# Patient Record
Sex: MALE | Race: WHITE
[De-identification: names, ages, dates, MRNs, and addresses within clinical notes are randomized per-mention and may not be internally consistent; named-entity substitution may affect disease eponyms.]

---

## 2017-12-02 ENCOUNTER — HOSPITAL ENCOUNTER (EMERGENCY)
Dept: HOSPITAL 61 - ER | Age: 62
Discharge: HOME | End: 2017-12-02
Payer: COMMERCIAL

## 2017-12-02 VITALS — DIASTOLIC BLOOD PRESSURE: 86 MMHG | SYSTOLIC BLOOD PRESSURE: 138 MMHG

## 2017-12-02 VITALS — HEIGHT: 68 IN | WEIGHT: 195 LBS | BODY MASS INDEX: 29.55 KG/M2
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
## 2017-12-02 DIAGNOSIS — I10: ICD-10-CM

## 2017-12-02 DIAGNOSIS — E78.00: ICD-10-CM
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
## 2017-12-02 DIAGNOSIS — S91.352A: Primary | ICD-10-CM

## 2017-12-02 DIAGNOSIS — Z95.5: ICD-10-CM
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
## 2017-12-02 DIAGNOSIS — I25.10: ICD-10-CM
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
## 2017-12-02 PROCEDURE — 90471 IMMUNIZATION ADMIN: CPT

## 2017-12-02 PROCEDURE — 12002 RPR S/N/AX/GEN/TRNK2.6-7.5CM: CPT

## 2017-12-02 PROCEDURE — 90715 TDAP VACCINE 7 YRS/> IM: CPT

## 2017-12-02 NOTE — PHYS DOC
Past Medical History


Past Medical History:  CAD, High Cholesterol, Hypertension


Past Surgical History:  Angioplasty, Other


Additional Past Surgical Histo:  cardiac cath x 5 with 9 stents placed


Alcohol Use:  None


Drug Use:  None





Adult General


Chief Complaint


Chief Complaint:  ANIMAL BITE





HPI


HPI





Patient is a 62  year old L presents to the emergency department with 

complaints of a dog bite to the left foot. Patient states that he was putting a 

shock color on his dog when she nipped at his hand and then began to bite his 

foot. Dog shots are up-to-date. He has had the dog since it was a puppy. The 

dog is 6 years old and patient reports that on to be otherwise healthy. Patient 

complains of a dog bite to the left foot.





Review of Systems


Review of Systems





Constitutional: Denies fever or chills []


Musculoskeletal: Denies back pain or joint pain []


Integument: Dog  Bite, laceration


Neurologic: Denies headache, focal weakness or sensory changes []


Endocrine: Denies polyuria or polydipsia []





All other systems were reviewed and found to be within normal limits, except as 

documented in this note.





Current Medications


Current Medications





Current Medications








 Medications


  (Trade)  Dose


 Ordered  Sig/Bear  Start Time


 Stop Time Status Last Admin


Dose Admin


 


 Diphtheria/


 Tetanus/Acell


 Pertussis


  (Boostrix)  0.5 ml  ONCE ONCE  12/2/17 16:45


 12/2/17 16:46 DC 12/2/17 17:41


0.5 ML


 


 Lidocaine HCl  20 ml  1X  ONCE  12/2/17 16:30


 12/2/17 16:31 DC 12/2/17 17:42


20 ML











Allergies


Allergies





Allergies








Coded Allergies Type Severity Reaction Last Updated Verified


 


  No Known Drug Allergies    7/30/15 No











Physical Exam


Physical Exam





Constitutional: Well developed, well nourished, no acute distress, non-toxic 

appearance. []


Skin: Warm, dry, left foot, dorsal aspect, proximal to the great and second toe 

there is a late laceration with total length of 5 cm. fourth toe does have a 1 

cm superficial laceration to the lateral aspect. Toes neurovascular intact 

distal. Full range of motion of all toes without difficulty.





Current Patient Data


Vital Signs





 Vital Signs








  Date Time  Temp Pulse Resp B/P (MAP) Pulse Ox O2 Delivery O2 Flow Rate FiO2


 


12/2/17 16:15 98.0 77 16  97 Room Air  





 98.0       











EKG


EKG


[]





Radiology/Procedures


Radiology/Procedures


[]





Course & Med Decision Making


Course & Med Decision Making


Pertinent Labs and Imaging studies reviewed. (See chart for details)


Procedure note:  Wound anesthetized with 1% lidocaine, total of 8 mL. The 

wounds were cleansed with Betadine and copiously irrigated, explored for 

foreign body noted which are noted. Wound edges were approximated with a total 

of 11 simple interrupted sutures, 3-0 Ethilon. Patient tolerated procedure 

well. Wound was dressed with bulky bandage.





Dragon Disclaimer


Dragon Disclaimer


This electronic medical record was generated, in whole or in part, using a 

voice recognition dictation system.





Departure


Departure


Impression:  


 Primary Impression:  


 Dog bite


Disposition:  01 HOME, SELF-CARE


Condition:  STABLE


Referrals:  


[facility] (PCP)


Patient Instructions:  Animal Bite, Wound Care, Easy-to-Read


Scripts


Acetaminophen With Codeine (TYLENOL WITH CODEINE #3 TABLET) 1 Each Tablet


1 TAB PO PRN Q6HRS Y for PAIN, #20 TAB


   Prov: KENNETH NORTON         12/2/17 


Amoxicillin/Potassium Clav (AMOX TR-K -125 MG TAB) 1 Each Tablet


1 TAB PO BID, #20 TAB


   Prov: KENNETH NORTON         12/2/17





Problem Qualifiers








 Primary Impression:  


 Dog bite


 Encounter type:  initial encounter  Qualified Codes:  W54.0XXA - Bitten by dog

, initial encounter








KENNETH NORTON Dec 2, 2017 16:33

## 2017-12-13 ENCOUNTER — HOSPITAL ENCOUNTER (EMERGENCY)
Dept: HOSPITAL 61 - ER | Age: 62
LOS: 1 days | Discharge: HOME | End: 2017-12-14
Payer: COMMERCIAL

## 2017-12-13 VITALS
SYSTOLIC BLOOD PRESSURE: 138 MMHG | DIASTOLIC BLOOD PRESSURE: 85 MMHG | DIASTOLIC BLOOD PRESSURE: 85 MMHG | SYSTOLIC BLOOD PRESSURE: 138 MMHG | SYSTOLIC BLOOD PRESSURE: 138 MMHG | DIASTOLIC BLOOD PRESSURE: 85 MMHG

## 2017-12-13 DIAGNOSIS — E78.00: ICD-10-CM
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
## 2017-12-13 DIAGNOSIS — I25.10: ICD-10-CM

## 2017-12-13 DIAGNOSIS — I10: ICD-10-CM
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
## 2017-12-13 DIAGNOSIS — S91.352D: Primary | ICD-10-CM

## 2017-12-13 PROCEDURE — 99282 EMERGENCY DEPT VISIT SF MDM: CPT

## 2017-12-13 NOTE — PHYS DOC
Past Medical History


Past Medical History:  CAD, High Cholesterol, Hypertension


Past Surgical History:  Angioplasty, Other


Additional Past Surgical Histo:  cardiac cath x 5 with 9 stents placed


Alcohol Use:  None


Drug Use:  None





Adult General


Chief Complaint


Chief Complaint:  SUTURE/STAPLE REMOVAL





[facility]


HPI





Patient is a 62  year old male presents to the emergency department with 

request for suture removal from his left foot. Patient was evaluated in the 

emergency department on December 2 after he was bitten on the foot by his dog. 

The wounds were repaired, 11 simple erupted sutures total. Patient's no 

complaints





Review of Systems


Review of Systems





Constitutional: Denies fever or chills []


Eyes: Denies change in visual acuity, redness, or eye pain []


HENT: Denies nasal congestion or sore throat []


Respiratory: Denies cough or shortness of breath []


Cardiovascular: No additional information not addressed in HPI []


GI: Denies abdominal pain, nausea, vomiting, bloody stools or diarrhea []


: Denies dysuria or hematuria []


Musculoskeletal: Denies back pain or joint pain []


Integument: sutures left foot


Neurologic: Denies headache, focal weakness or sensory changes []


Endocrine: Denies polyuria or polydipsia []





All other systems were reviewed and found to be within normal limits, except as 

documented in this note.





Allergies


Allergies





Allergies








Coded Allergies Type Severity Reaction Last Updated Verified


 


  No Known Drug Allergies    7/30/15 No











Physical Exam


Physical Exam





Constitutional: Well developed, well nourished, no acute distress, non-toxic 

appearance. []


Skin: Warm, dry, suture lines well approximated,  mild erythema, nontender, no 

warmth.  Full range of motion all digits, NVI





Current Patient Data


Vital Signs





 Vital Signs








  Date Time  Temp Pulse Resp B/P (MAP) Pulse Ox O2 Delivery O2 Flow Rate FiO2


 


12/13/17 23:50  91 20  99 Room Air  











EKG


EKG


[]





Radiology/Procedures


Radiology/Procedures


[]





Course & Med Decision Making


Course & Med Decision Making


Pertinent Labs and Imaging studies reviewed. (See chart for details)


Suture removal. Area cleansed with NS.   Total 11 sutures removed.  Wound 

dressed with neosporin and band aid.   Pt tolerated well.


[]





Dragon Disclaimer


Dragon Disclaimer


This electronic medical record was generated, in whole or in part, using a 

voice recognition dictation system.





Departure


Departure


Impression:  


 Primary Impression:  


 Visit for suture removal


Disposition:  01 HOME, SELF-CARE


Condition:  STABLE


Referrals:  


[facility] (PCP)


Patient Instructions:  Suture Removal











KENNETH NORTON Dec 13, 2017 23:36

## 2020-07-06 ENCOUNTER — HOSPITAL ENCOUNTER (OUTPATIENT)
Dept: HOSPITAL 61 - NM | Age: 65
Discharge: HOME | End: 2020-07-06
Attending: INTERNAL MEDICINE
Payer: COMMERCIAL

## 2020-07-06 DIAGNOSIS — I48.91: ICD-10-CM

## 2020-07-06 DIAGNOSIS — I25.10: Primary | ICD-10-CM

## 2020-07-06 DIAGNOSIS — I10: ICD-10-CM

## 2020-07-06 PROCEDURE — A9500 TC99M SESTAMIBI: HCPCS

## 2020-07-06 PROCEDURE — 78452 HT MUSCLE IMAGE SPECT MULT: CPT

## 2020-07-06 PROCEDURE — 93017 CV STRESS TEST TRACING ONLY: CPT

## 2020-07-06 NOTE — RAD
MR#: [id number]

Account#: [id number]

Accession#: [id number]

Date of Study: 07/06/2020

Ordering Physician: JOSELUIS KAUFMAN, 

Referring Physician: ROXY SUMNER Tech: RT Jena (R) (N)





--------------- APPROVED REPORT --------------





Test Type:          Pharmacological

Stress Nurse/Tech: Blossom Menjivar R.N.

Test Indications: CAD, SOA

Cardiac History: CAD, 9 stents, COPD, smoker,obese

Medications:     See Electronic Medical Record

Medical History: See Electronic Medical Record

Resting ECG:     SR w/ occ. pvc

Resting Heart Rate: 75 bpm

Resting Blood Pressure: 97/48mmHg

Pretest Chest Pain: No chest pain



Nurse/Tech Notes

S1S2, lungs CTA

Consent: The procedure was explained to the patient in lay terms. Informed consent was witnessed. Joshua
eout was entered into [employer]. History and Stress Test performed by TIM Bailon



Pharm. Details

Pharmacologic stress testing was performed using 0.4mg per 5ml of regadenoson given intravenously ove
r 7-10 seconds.



Stress Symptoms

unable to complete treadmill portion as pt was too SOA- had slight c/p scale 1.5/10 after pt sat down
 post treadmill attempt. pain resolved within 3 minutes-prior to start of lexiscan dose,  post lexisc
an pt had SOA, HA



POST EXERCISE

Reason for Termination: Infusion complete

Max HR: 95 bpm

Max Blood Pressure: 118/66mmHg

Blood Pressure response to exercise: Normal blood pressure response during stress.

Heart Rate response to exercise: wnl

Chest Pain: Yes. see above note

Arrhythmia: No. PVC's at baseline

ST Change: No. 



INTERPRETATION

Stress EKG Conclusion: No evidence of stress induced EKG changes. 



Imaging Protocol

IMAGE PROTOCOL: Rest Tc-99m/stress Tc-99m 1 day



Rest:            Stress:         Viability:   

Radiopharm.Tc99m RntdwwnmnWz12s Sestamibi

Gkrq53jAo            33mCi            

Duration    15min.           10min.           

Img Date  07/06/2020 07/06/2020      

Inj-Img Aroh91qom.           60min.           



Rest Admin Site:IV - Left AntecubitalAdministrator:TIM Bailon

Stress Admin Site: IV - Left AntecubitalAdministrator: TIM Bailon



STRESS DATA

End Diast. Vol.102.0mlLVEDV index BSA48.0ml

End Syst. Vol.39.0mlLVESV index BSA18.0ml

Myocardial Ysrn555.0gEject. Gfglfcad68.0%



Stress Scores

Regional WT0.00Summed WT8.00

Regional WM0.00Summed WM3.00



LV Perfusion

There is a small fixed distal anteroseptal defect suggestive of small prior infarct without active is
chemia. There is also a moderate sized basal inferolateral defect suggestive of prior infarct without
 ischemia. 



Wall Motion

Normal wall motion. EF 65%



LV Perf. Quant

17 Seg. SSS6.00

17 Seg. SRS2.00

17 Seg. SDS4.00

Stress Defect Extent (% LAD)13.80Rest Defect Extent (% LAD)8.80Rev. Defect Extent (% LAD)5.60

Stress Defect Extent (% LCX) 30.00Rest Defect Extent (% LCX)7.50Rev. Defect Extent (% LCX)26.30

Stress Defect Extent (% RCA)0.00Rest Defect Extent (% RCA)6.70Rev. Defect Extent (% RCA)0.00

Stress Defect Extent (% SALMA)10.70Rest Defect Extent (% SALMA)5.70Rev. Defect Extent (% SALMA)7.00



Other Information

Quality:Average

Risk Assessment: Moderate Risk



Conclusion

1. No evidence of stress induced EKG changes.

2. Poor exercise tolerance as patient unable to complete treadmill, had to be converted to chemical s
tress.

3. Fixed distal anterior and basal inferior defects.

4. Normal EF at 65%

5. Moderate risk study



Signed by : Adriel Holt, 

Electronically Approved : 07/06/2020 14:31:43

## 2020-09-08 ENCOUNTER — HOSPITAL ENCOUNTER (OUTPATIENT)
Dept: HOSPITAL 61 - ECHO | Age: 65
Discharge: HOME | End: 2020-09-08
Attending: INTERNAL MEDICINE
Payer: COMMERCIAL

## 2020-09-08 DIAGNOSIS — R06.02: ICD-10-CM

## 2020-09-08 DIAGNOSIS — I35.1: Primary | ICD-10-CM

## 2020-09-08 PROCEDURE — 93306 TTE W/DOPPLER COMPLETE: CPT

## 2020-09-08 NOTE — CARD
MR#: [id number]

Account#: [id number]

Accession#: [id number]

Date of Study: 09/08/2020

Ordering Physician: JOSELUIS KAUFMAN, 

Referring Physician: JOSELUIS KAUFMAN, 

Tech: Leydi Ramsey [facility]





--------------- APPROVED REPORT --------------





EXAM: Two-dimensional and M-mode echocardiogram with Doppler and color Doppler.



Other Information 

Quality : Fair



INDICATION

Dyspnea 



2D DIMENSIONS 

Left Atrium(2D)3.7 (1.6-4.0cm)IVSd0.8 (0.7-1.1cm)

Aortic Root(2D)3.0 (2.0-3.7cm)LVDd4.0 (3.9-5.9cm)

LVOT Diameter2.3 (1.8-2.4cm)PWd0.8 (0.7-1.1cm)

LVDs2.9 (2.5-4.0cm)FS (%) 29.0 %

SV39.8 mlLVEF(%)56.3 (>50%)



Aortic Valve

AoV Peak Callum.138.4cm/sAoV VTI23.8cm

AO Peak GR.7.7mmHgLVOT Peak Callum.123.7cm/s

AO Mean GR.4mmHgAVA (VMAX)3.58cm2

LISSETT   (VTI)3.04dn4MK P 1/2 Nwuz777dn



Mitral Valve

MV E Scrrpdtu24.2cm/sMV DECEL AEFJ650ie

MV A Ivdjnevx732.7cm/sE/A  Ratio0.5



Pulmonary Vein

S1 Fbwhnjps77.3cm/sD2 Kjetvmkr64.6cm/s



 LEFT VENTRICLE 

The left ventricle is normal size. There is normal left ventricular wall thickness. The left ventricu
lar systolic function is normal and the ejection fraction is within normal range. The Ejection Fracti
on is 55-60%. There is normal LV segmental wall motion. Transmitral Doppler flow pattern is Grade I-a
bnormal relaxation pattern.



 RIGHT VENTRICLE 

The right ventricle is normal size. The right ventricular systolic function is normal.



 ATRIA 

The left atrium size is normal. The right atrium size is normal. The interatrial septum is intact wit
h no evidence for an atrial septal defect or patent foramen ovale as noted on 2-D or Doppler imaging.




 AORTIC VALVE 

The aortic valve is not well visualized but grossly appears to be trileaflet. Doppler and Color Flow 
revealed mild aortic regurgitation. There is no significant aortic valvular stenosis.



 MITRAL VALVE 

The mitral valve is normal in structure and function. There is no evidence of mitral valve prolapse. 
There is no mitral valve stenosis. Doppler and Color Flow revealed no mitral valve regurgitation note
d.



 TRICUSPID VALVE 

The tricuspid valve is normal in structure and function. Doppler and Color Flow revealed no tricuspid
 valve regurgitation noted. There is no tricuspid valve stenosis.



 PULMONIC VALVE 

The pulmonic valve is not well visualized. Doppler and Color Flow revealed no pulmonic valvular regur
gitation. There is no pulmonic valvular stenosis.



 GREAT VESSELS 

The aortic root is normal in size. The ascending aorta is not well seen. The IVC was not visualized.



 PERICARDIAL EFFUSION 

There is no evidence of significant pericardial effusion.



Critical Notification

Critical Value: No



<Conclusion>

The left ventricular systolic function is normal and the ejection fraction is within normal range. Th
e Ejection Fraction is 55-60%.

There is normal LV segmental wall motion.



Signed by : Adriel Holt, 

Electronically Approved : 09/08/2020 14:11:17

## 2021-05-24 ENCOUNTER — HOSPITAL ENCOUNTER (EMERGENCY)
Dept: HOSPITAL 61 - ER | Age: 66
Discharge: HOME | End: 2021-05-24
Payer: MEDICARE

## 2021-05-24 VITALS — HEIGHT: 68 IN | BODY MASS INDEX: 33.41 KG/M2 | WEIGHT: 220.46 LBS

## 2021-05-24 VITALS — DIASTOLIC BLOOD PRESSURE: 80 MMHG | SYSTOLIC BLOOD PRESSURE: 128 MMHG

## 2021-05-24 DIAGNOSIS — I25.10: ICD-10-CM

## 2021-05-24 DIAGNOSIS — I25.2: ICD-10-CM

## 2021-05-24 DIAGNOSIS — I10: ICD-10-CM

## 2021-05-24 DIAGNOSIS — I70.202: Primary | ICD-10-CM

## 2021-05-24 DIAGNOSIS — E78.00: ICD-10-CM

## 2021-05-24 DIAGNOSIS — Z95.5: ICD-10-CM

## 2021-05-24 DIAGNOSIS — F17.200: ICD-10-CM

## 2021-05-24 PROCEDURE — 93970 EXTREMITY STUDY: CPT

## 2021-05-24 PROCEDURE — 93923 UPR/LXTR ART STDY 3+ LVLS: CPT

## 2021-05-24 NOTE — RAD
US DPLX ARTR EXTREM LOWER BILAT



Indication: Reason: BLE redness swelling / Spl. Instructions:  / History:  



Comparison: None.



Procedure: Real-time grayscale, color flow Doppler, and Doppler spectral waveform analysis of the art
erial system of the lower extremity is performed.



Findings: 

Right lower extremity: Triphasic waveforms throughout the right lower extremity. Mildly elevated velo
city within the right common femoral artery measures 174 cm/s. Mild atheromatous plaque.



Left lower extremity: Triphasic waveform throughout the left lower extremity. No significant velocity
 elevation. Mild atheromatous plaque.



IMPRESSION:

1.  No arterial occlusion.

2.  Mildly elevated velocity within the right common femoral artery, may indicate 30-49 percent steno
sis.

3.  Mild atheromatous plaque.



Electronically signed by: Mirza San DO (5/24/2021 9:52 PM) [facility]

## 2021-05-24 NOTE — RAD
US BILATERAL LOWEREXTREMITY VENOUS DOPPLER



History: Reason: BLE redness swelling / Spl. Instructions:  / History: 



Comparison:  None.



Discussion: 



Multiple longitudinal and transverse high resolution real-time images of the venous system of [facility] lower extremity were obtained with color and Doppler sampling. The common femoral, superficial fem
oral, popliteal and proximal calf veins are all patent and demonstrate normal flow and compressibilit
y. Normal respiratory phasicity and augmentation is present.  



Impression: 

1.  No evidence of deep vein thrombosis.



Electronically signed by: Mirza San DO (5/24/2021 9:52 PM) [facility]

## 2021-05-24 NOTE — PHYS DOC
Past Medical History


Past Medical History:  CAD, High Cholesterol, Hypertension, MI


Past Surgical History:  Angioplasty, Other


Additional Past Surgical Histo:  cardiac cath x 5 with 9 stents placed


Smoking Status:  Current Every Day Smoker


Alcohol Use:  None


Drug Use:  None





General Adult


EDM:


Chief Complaint:  LOWER EXTREMITY SWELLING





HPI:


HPI:





Patient is a 65  year old male with history of MI, hypertension, CAD, high c

holesterol, who presents to the ED today complaining of bilateral lower 

extremity swelling and redness, symptoms have been going on for 2 days.  Patient

states he has been painting his house and has been on his feet for more than 

normal.  He is worried he is having no circulation to bilateral feet.  He is 

currently on blood thinners





Review of Systems:


Review of Systems:


Constitutional:   Denies fever or chills. []


Eyes:   Denies change in visual acuity. []


HENT:   Denies nasal congestion or sore throat. [] 


Respiratory:   Denies cough or shortness of breath. [] 


Cardiovascular:   Denies chest pain or edema. [] 


GI:   Denies abdominal pain, nausea, vomiting, bloody stools or diarrhea. [] 


:  Denies dysuria. [] 


Musculoskeletal:   Reports bilateral lower extremity swelling, redness, denies 

any back pain


Integument:   Denies rash. [] 


Neurologic:   Denies headache, focal weakness or sensory changes. [] 


Psychiatric:  Denies depression or anxiety. []





Heart Score:


C/O Chest Pain:  N/A


Risk Factors:


Risk Factors:  DM, Current or recent (<one month) smoker, HTN, HLP, family 

history of CAD, obesity.


Risk Scores:


Score 0 - 3:  2.5% MACE over next 6 weeks - Discharge Home


Score 4 - 6:  20.3% MACE over next 6 weeks - Admit for Clinical Observation


Score 7 - 10:  72.7% MACE over next 6 weeks - Early Invasive Strategies





Allergies:


Allergies:





Allergies








Coded Allergies Type Severity Reaction Last Updated Verified


 


  No Known Drug Allergies    7/30/15 No











Physical Exam:


PE:





Constitutional: Well developed, well nourished, no acute distress, non-toxic 

appearance. []


HENT: Normocephalic, atraumatic, bilateral external ears normal, oropharynx 

moist, no oral exudates, nose normal. []


Eyes: PERRLA, EOMI, conjunctiva normal, no discharge. [] 


Neck: Normal range of motion, no tenderness, supple, no stridor. [] 


Cardiovascular:Heart rate regular rhythm, no murmur []


Lungs & Thorax:  Bilateral breath sounds clear to auscultation []


Abdomen: Bowel sounds normal, soft, no tenderness, no masses, no pulsatile 

masses. [] 


Skin: Warm, dry, no erythema, no rash. [] 


Back: No tenderness, no CVA tenderness. [] 


Extremities: No tenderness, no cyanosis, no clubbing, ROM intact, +1 edema to 

bilateral lower extremities, trace redness noted to bilateral toes.  +2 

bilateral pedal pulses, cap refill slightly delayed on the right toes but normal

 on the right toes


Neurologic: Alert and oriented X 3, normal motor function, normal sensory 

function, no focal deficits noted. []


Psychologic: Affect normal, judgement normal, mood normal. []





Current Patient Data:


Vital Signs:





                                   Vital Signs








  Date Time  Temp Pulse Resp B/P (MAP) Pulse Ox O2 Delivery O2 Flow Rate FiO2


 


5/24/21 21:14  72 28 130/82 (98) 99 Nasal Cannula 2.0 


 


5/24/21 18:15 97.9       





 97.9       











EKG:


EKG:


[]





Radiology/Procedures:


Radiology/Procedures:


[]PROCEDURE: VENOUS LOWER EXT BILATERAL





US BILATERAL LOWEREXTREMITY VENOUS DOPPLER





History: Reason: BLE redness swelling / Spl. Instructions:  / History: 





Comparison:  None.





Discussion: 





Multiple longitudinal and transverse high resolution real-time images of the 

venous system of bilateral lower extremity were obtained with color and Doppler 

sampling. The common femoral, superficial femoral, popliteal and proximal calf 

veins are all patent and demonstrate normal flow and compressibility. Normal 

respiratory phasicity and augmentation is present.  





Impression: 


1.  No evidence of deep vein thrombosis.





Electronically signed by: Ronald Barnhart DO (5/24/2021 9:52 PM) [facility]














DICTATED and SIGNED BY:     RONALD BARNHART DO


DATE:     05/24/21 [id number]


PROCEDURE: ARTERIAL STUDY LOWER EXT BI





US DPLX ARTR EXTREM LOWER BILAT





Indication: Reason: BLE redness swelling / Spl. Instructions:  / History:  





Comparison: None.





Procedure: Real-time grayscale, color flow Doppler, and Doppler spectral 

waveform analysis of the arterial system of the lower extremity is performed.





Findings: 


Right lower extremity: Triphasic waveforms throughout the right lower extremity.

 Mildly elevated velocity within the right common femoral artery measures 174 

cm/s. Mild atheromatous plaque.





Left lower extremity: Triphasic waveform throughout the left lower extremity. No

 significant velocity elevation. Mild atheromatous plaque.





IMPRESSION:


1.  No arterial occlusion.


2.  Mildly elevated velocity within the right common femoral artery, may 

indicate 30-49 percent stenosis.


3.  Mild atheromatous plaque.





Electronically signed by: Ronald Barnhart DO (5/24/2021 9:52 PM) [facility]














DICTATED and SIGNED BY:     RONALD BARNHART DO


DATE:     05/24/21 [id number] 0





Course & Med Decision Making:


Course & Med Decision Making


Pertinent Labs and Imaging studies reviewed. (See chart for details)





This is a 65-year-old male patient presenting to the ED today complaining of 

bilateral lower extremity swelling, redness, symptoms since yesterday after 

spending longer.  Of time painting his house.  Patient is worried he has no 

circulation in his lower extremities.  He has +2 pedal pulses bilaterally.





Venous Doppler of bilateral lower extremities are negative.  Arterial Dopplers 

of bilateral lower extremities were noted for stenosis of 30 to 49% on the right

 f common femoral artery.  Mild atheromatous plaques 





Patient was discharged to home, follow-up with vascular.  Compression stockings 

recommended.  He is currently on a blood thinner.  Encouraged to stay off his 

feet





Dragon Disclaimer:


Dragon Disclaimer:


This electronic medical record was generated, in whole or in part, using a voice

 recognition dictation system.





Departure


Departure


Impression:  


   Primary Impression:  


   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


   Additional Impressions:  


   Edema


   Qualified Codes:  R60.9 - Edema, unspecified


   PVD (peripheral vascular disease)


Disposition:  01 HOME / SELF CARE / HOMELESS


Condition:  STABLE


Referrals:  


NO PCP (PCP)








NASRIN GRANT II, MD


follow up in one week


Patient Instructions:  Edema, Easy-to-Read, Peripheral Vascular Disease, 

Easy-to-Read





Additional Instructions:  


You were evaluated in the emergency room and noted to have stenosis on your 

right lower extremity and plaques.  Please follow-up with your vascular surgeon 

as well your primary care doctor and cardiology











ROHINI REYNOLDS            May 24, 2021 22:42

## 2022-01-03 ENCOUNTER — HOSPITAL ENCOUNTER (INPATIENT)
Dept: HOSPITAL 61 - ER | Age: 67
LOS: 2 days | Discharge: ANOTHER HOSPITAL | DRG: 281 | End: 2022-01-05
Attending: INTERNAL MEDICINE | Admitting: INTERNAL MEDICINE
Payer: MEDICARE

## 2022-01-03 VITALS — HEIGHT: 68 IN | BODY MASS INDEX: 37.12 KG/M2 | WEIGHT: 244.93 LBS

## 2022-01-03 VITALS — DIASTOLIC BLOOD PRESSURE: 78 MMHG | SYSTOLIC BLOOD PRESSURE: 121 MMHG

## 2022-01-03 DIAGNOSIS — Z95.5: ICD-10-CM

## 2022-01-03 DIAGNOSIS — I50.9: ICD-10-CM

## 2022-01-03 DIAGNOSIS — T82.855A: ICD-10-CM

## 2022-01-03 DIAGNOSIS — K21.9: ICD-10-CM

## 2022-01-03 DIAGNOSIS — Z82.49: ICD-10-CM

## 2022-01-03 DIAGNOSIS — F17.210: ICD-10-CM

## 2022-01-03 DIAGNOSIS — I25.110: ICD-10-CM

## 2022-01-03 DIAGNOSIS — I73.9: ICD-10-CM

## 2022-01-03 DIAGNOSIS — E78.5: ICD-10-CM

## 2022-01-03 DIAGNOSIS — I25.2: ICD-10-CM

## 2022-01-03 DIAGNOSIS — Z71.6: ICD-10-CM

## 2022-01-03 DIAGNOSIS — E78.00: ICD-10-CM

## 2022-01-03 DIAGNOSIS — E66.9: ICD-10-CM

## 2022-01-03 DIAGNOSIS — I11.0: ICD-10-CM

## 2022-01-03 DIAGNOSIS — Z20.822: ICD-10-CM

## 2022-01-03 DIAGNOSIS — I21.4: Primary | ICD-10-CM

## 2022-01-03 DIAGNOSIS — M19.90: ICD-10-CM

## 2022-01-03 LAB
ALBUMIN SERPL-MCNC: 3.5 G/DL (ref 3.4–5)
ALBUMIN/GLOB SERPL: 0.8 {RATIO} (ref 1–1.7)
ALP SERPL-CCNC: 155 U/L (ref 46–116)
ALT SERPL-CCNC: 56 U/L (ref 16–63)
ANION GAP SERPL CALC-SCNC: 7 MMOL/L (ref 6–14)
APTT BLD: 32 SEC (ref 24–38)
AST SERPL-CCNC: 33 U/L (ref 15–37)
BASOPHILS # BLD AUTO: 0.1 X10^3/UL (ref 0–0.2)
BASOPHILS NFR BLD: 1 % (ref 0–3)
BILIRUB SERPL-MCNC: 0.2 MG/DL (ref 0.2–1)
BUN SERPL-MCNC: 13 MG/DL (ref 8–26)
BUN/CREAT SERPL: 16 (ref 6–20)
CALCIUM SERPL-MCNC: 8.8 MG/DL (ref 8.5–10.1)
CHLORIDE SERPL-SCNC: 103 MMOL/L (ref 98–107)
CO2 SERPL-SCNC: 29 MMOL/L (ref 21–32)
CREAT SERPL-MCNC: 0.8 MG/DL (ref 0.7–1.3)
EOSINOPHIL NFR BLD: 0.2 X10^3/UL (ref 0–0.7)
EOSINOPHIL NFR BLD: 4 % (ref 0–3)
ERYTHROCYTE [DISTWIDTH] IN BLOOD BY AUTOMATED COUNT: 13.9 % (ref 11.5–14.5)
GFR SERPLBLD BASED ON 1.73 SQ M-ARVRAT: 96.7 ML/MIN
GLUCOSE SERPL-MCNC: 160 MG/DL (ref 70–99)
HCT VFR BLD CALC: 41.5 % (ref 39–53)
HGB BLD-MCNC: 13.9 G/DL (ref 13–17.5)
LIPASE: 66 U/L (ref 73–393)
LYMPHOCYTES # BLD: 1.6 X10^3/UL (ref 1–4.8)
LYMPHOCYTES NFR BLD AUTO: 31 % (ref 24–48)
MAGNESIUM SERPL-MCNC: 2 MG/DL (ref 1.8–2.4)
MCH RBC QN AUTO: 29 PG (ref 25–35)
MCHC RBC AUTO-ENTMCNC: 34 G/DL (ref 31–37)
MCV RBC AUTO: 86 FL (ref 79–100)
MONO #: 0.6 X10^3/UL (ref 0–1.1)
MONOCYTES NFR BLD: 11 % (ref 0–9)
NEUT #: 2.8 X10^3/UL (ref 1.8–7.7)
NEUTROPHILS NFR BLD AUTO: 53 % (ref 31–73)
PLATELET # BLD AUTO: 256 X10^3/UL (ref 140–400)
POTASSIUM SERPL-SCNC: 4 MMOL/L (ref 3.5–5.1)
PROT SERPL-MCNC: 7.7 G/DL (ref 6.4–8.2)
PROTHROMBIN TIME: 11.8 SEC (ref 11.7–14)
RBC # BLD AUTO: 4.83 X10^6/UL (ref 4.3–5.7)
SODIUM SERPL-SCNC: 139 MMOL/L (ref 136–145)
WBC # BLD AUTO: 5.3 X10^3/UL (ref 4–11)

## 2022-01-03 PROCEDURE — 80048 BASIC METABOLIC PNL TOTAL CA: CPT

## 2022-01-03 PROCEDURE — 93458 L HRT ARTERY/VENTRICLE ANGIO: CPT

## 2022-01-03 PROCEDURE — 87426 SARSCOV CORONAVIRUS AG IA: CPT

## 2022-01-03 PROCEDURE — 85520 HEPARIN ASSAY: CPT

## 2022-01-03 PROCEDURE — 85027 COMPLETE CBC AUTOMATED: CPT

## 2022-01-03 PROCEDURE — 83690 ASSAY OF LIPASE: CPT

## 2022-01-03 PROCEDURE — 99285 EMERGENCY DEPT VISIT HI MDM: CPT

## 2022-01-03 PROCEDURE — 85610 PROTHROMBIN TIME: CPT

## 2022-01-03 PROCEDURE — 36415 COLL VENOUS BLD VENIPUNCTURE: CPT

## 2022-01-03 PROCEDURE — 83735 ASSAY OF MAGNESIUM: CPT

## 2022-01-03 PROCEDURE — 83880 ASSAY OF NATRIURETIC PEPTIDE: CPT

## 2022-01-03 PROCEDURE — 99153 MOD SED SAME PHYS/QHP EA: CPT

## 2022-01-03 PROCEDURE — 84484 ASSAY OF TROPONIN QUANT: CPT

## 2022-01-03 PROCEDURE — 85730 THROMBOPLASTIN TIME PARTIAL: CPT

## 2022-01-03 PROCEDURE — 93970 EXTREMITY STUDY: CPT

## 2022-01-03 PROCEDURE — 93880 EXTRACRANIAL BILAT STUDY: CPT

## 2022-01-03 PROCEDURE — C8929 TTE W OR WO FOL WCON,DOPPLER: HCPCS

## 2022-01-03 PROCEDURE — 71045 X-RAY EXAM CHEST 1 VIEW: CPT

## 2022-01-03 PROCEDURE — 80061 LIPID PANEL: CPT

## 2022-01-03 PROCEDURE — G0378 HOSPITAL OBSERVATION PER HR: HCPCS

## 2022-01-03 PROCEDURE — 99152 MOD SED SAME PHYS/QHP 5/>YRS: CPT

## 2022-01-03 PROCEDURE — 96374 THER/PROPH/DIAG INJ IV PUSH: CPT

## 2022-01-03 PROCEDURE — 93005 ELECTROCARDIOGRAM TRACING: CPT

## 2022-01-03 PROCEDURE — 80053 COMPREHEN METABOLIC PANEL: CPT

## 2022-01-03 PROCEDURE — 85025 COMPLETE CBC W/AUTO DIFF WBC: CPT

## 2022-01-03 PROCEDURE — 84443 ASSAY THYROID STIM HORMONE: CPT

## 2022-01-03 PROCEDURE — 83036 HEMOGLOBIN GLYCOSYLATED A1C: CPT

## 2022-01-03 RX ADMIN — ATORVASTATIN CALCIUM SCH MG: 40 TABLET, FILM COATED ORAL at 22:00

## 2022-01-03 RX ADMIN — METOPROLOL TARTRATE SCH MG: 25 TABLET ORAL at 22:00

## 2022-01-03 RX ADMIN — HEPARIN SODIUM PRN MLS/HR: 10000 INJECTION, SOLUTION INTRAVENOUS at 18:51

## 2022-01-03 RX ADMIN — PANTOPRAZOLE SODIUM SCH MG: 40 TABLET, DELAYED RELEASE ORAL at 20:35

## 2022-01-03 NOTE — PDOC1
History and Physical


Date of Admission


Date of Admission


DATE: 1/3/22 


TIME: 18:15





Identification/Chief Complaint


Chief Complaint


Chest pain





Source


Source:  Patient





History of Present Illness


History of Present Illness


Mr Elizalde is a 66  year old male with PMHx CAD (s/p cath x5 with 9 stents), 

HLD, HTN, PVD, smoker who presents to ED c/o sub-sternal chest pain that 

radiates down both of his arms that began at 1645 while at rest and was 

continuous, He notes this was similar to prior MI. He experienced some 

discomfort at 1000 this morning but it resolved on its own. He does 

intermittently experience anginal pain but it is generally remitting without 

treatment 1-3 times per month.


Patient denies any cough or fever, no trouble breathing. 


Patient is vaccinated for COVID-19. He does continue to smoke.


Chest radiograph with cardiomegaly, otherwise no acute abnormalities.





EKG heart rate of 95 bpm, sinus rhythm, no ST segment elevation.


Second EKG was done at 1744, heart rate of 91 bpm, sinus rhythm, there is some 

ST segment elevation in lead II and  I.


Labs with WBC 5.3, Hb 13.9, platelets 256, INR 0.9, PTT 32, , K4, BUN 13, 

CR 0.8, glucose 160, calcium 8.8, Magnesium 2, alkaline phosphatase 135 lipase 

66 otherwise LFTs within normal laboratory limits, NT proBNP is 335, high-

sensitivity troponin was 36.


Given NTG x3 with improvement in pain from 8/10 to 2/10 and started on heparin 

GTT and admitted for further care.





Past Medical History


Cardiovascular:  CAD, CHF, HTN, Hyperlipidemia





Past Surgical History


Past Surgical History:  Other





Family History


Family History:  Coronary Artery Disease, High Cholestrol, Hypertension





Social History


Smoke:  <1 pack per day


ALCOHOL:  none


Drugs:  None





Current Problem List


Problem List


Problems


Medical Problems:


(1) Acute coronary syndrome


Status: Acute  





(2) Chest pain


Status: Acute  











Current Medications


Current Medications





Current Medications


Nitroglycerin (Nitrostat) 0.4 mg PRN Q5MIN  PRN SL CHEST PAIN Last administered 

on 1/3/22at 17:30;  Start 1/3/22 at 17:45


Nitroglycerin (Nitrostat) 0.4 mg STK-MED ONCE SL ;  Start 1/3/22 at 17:32;  Stop

1/3/22 at 17:32;  Status DC


Heparin Sodium (Porcine) (Heparin Sodium) 4,000 unit 1X  ONCE IV ;  Start 1/3/22

at 18:15;  Stop 1/3/22 at 18:16


Heparin Sodium/ Dextrose 250 ml @  12.24 mls/ hr CONT  PRN IV PER PROTOCOL;  

Start 1/3/22 at 18:15


Heparin Sodium (Porcine) (Heparin Sodium) 2,550 unit PRN Q6HRS  PRN IV FOR UFH 

LEVEL LESS THAN 0.2;  Start 1/3/22 at 18:15





Active Scripts


Active


Clopidogrel (Clopidogrel Bisulfate) 75 Mg Tablet 75 Mg PO DAILYWBKFT


Aspirin Ec (Aspirin) 81 Mg Tablet.dr 81 Mg PO DAILYWBKFT 90 Days


Reported


Famotidine 40 Mg Tablet 40 Mg PO PRN DAILY PRN


Tums (Calcium Carbonate) 200 Mg Tab.chew 200 Mg PO PRN 1-2XD PRN


Atorvastatin Calcium 40 Mg Tablet 1 Tab PO DAILY


Metoprolol Tartrate 25 Mg Tablet 1 Tab PO BID





Allergies


Allergies:  


Coded Allergies:  


     No Known Drug Allergies (Unverified , 7/30/15)





ROS


General:  YES: Fatigue, Malaise; 


   No: Chills, Night Sweats, Appetite, Other


PSYCHOLOGICAL ROS:  YES: Anxiety; 


   No: Behavioral Disorder, Concentration difficultie, Decreased libido, 

Depression, Disorientation, Hallucinations, Hostility, Irritablity, Memory 

difficulties, Mood Swings, Obsessive thoughts, Physical abuse, Sexual abuse, 

Sleep disturbances, Suicidal ideation, Other


Eyes:  No Blurry vision, No Decreased vision, No Double vision, No Dry eyes, No 

Excessive tearing, No Eye Pain, No Itchy Eyes, No Loss of vision, No Phot

ophobia, No Scotomata, No Uses contacts, No Uses glasses, No Other


HEENT:  No: Heacaches, Visual Changes, Hearing change, Nasal congestion, Nasal 

discharge, Oral lesions, Sinus pain, Sore Throat, Epistaxis, Sneezing, Snoring, 

Tinnitus, Vertigo, Vocal changes, Other


ALLERGY AND IMMUNOLOGY:  No: Hives, Insect Bite Sensitivity, Itchy/Watery Eyes, 

Nasal Congestion, Post Nasal Drip, Seasonal Allergies, Other


Hematological and Lymphatic:  No: Bleeding Problems, Blood Clots, Blood 

Transfusions, Brusing, Night Sweats, Pallor, Swollen Lymph Nodes, Other


ENDOCRINE:  No: Breast Changes, Galactorrhea, Hair Pattern Changes, Hot Flashes,

Malaise/lethargy, Mood Swings, Palpitations, Polydipsia/polyuria, Skin Changes, 

Temperature Intolerance, Unexpected Weight Changes, Other


Breast:  No New/Changing Breast Lumps, No Nipple changes, No Nipple discharge, 

No Other


Respiratory:  YES: Shortness of breath; 


   No: Cough, Hemoptysis, Orthopnea, Pleuritic Pain, SOB with excertion, Sputum 

Changes, Stridor, Tachypnea, Wheezing, Other


Cardiovascular:  yes Chest Pain; 


   No Palpitations, No Orthopnea, No Paroxysmal Noc. Dyspnea, No Edema, No Lt 

Headedness, No Other


Gastrointestinal:  Yes Nausea; 


   No Vomiting, No Abdominal Pain, No Diarrhea, No Constipation, No Melena, No 

Hematochezia, No Other


Genitourinary:  No Dysuria, No Frequency, No Incontinence, No Hematuria, No 

Retention, No Discharge, No Urgency, No Pain, No Flank Pain, No Other, No , No ,

No , No , No , No , No 


Musculoskeletal:  No Gait Disturbance, No Joint Pain, No Joint Stiffness, No 

Joint Swelling, No Muscle Pain, No Muscular Weakness, No Pain In:, No Swelling 

In:, No Other


Neurological:  No Behavorial Changes, No Bowel/Bladder ControlChng, No 

Confusion, No Dizziness, No Gait Disturbance, No Headaches, No Impaired 

Coord/balance, No Memory Loss, No Numbness/Tingling, No Seizures, No Speech 

Problems, No Tremors, No Visual Changes, No Weakness, No Other


Skin:  No Dry Skin, No Eczema, No Hair Changes, No Lumps, No Mole Changes, No 

Mottling, No Nail Changes, No Pruritus, No Rash, No Skin Lesion Changes, No 

Other, No Acne





Physical Exam


General:  Alert, Oriented X3, Cooperative, moderate distress


HEENT:  Atraumatic, PERRLA, EOMI, Mucous membr. moist/pink


Lungs:  Clear to auscultation, Normal air movement


Heart:  S1S2, RRR, no thrills, no rubs, no gallops, no murmurs


Abdomen:  Normal bowel sounds, Soft, No tenderness, No hepatosplenomegaly, No 

masses


Rectal Exam:  not examined


Extremities:  No clubbing, No cyanosis, No edema, Normal pulses, No 

tenderness/swelling


Skin:  No rashes, No breakdown, No significant lesion


Neuro:  Normal gait, Normal speech, Strength at 5/5 X4 ext, Normal tone, 

Sensation intact, Cranial nerves 3-12 NL, Reflexes 2+


Psych/Mental Status:  Mental status NL, Mood NL





Vitals


Vitals





Vital Signs








  Date Time  Temp Pulse Resp B/P (MAP) Pulse Ox O2 Delivery O2 Flow Rate FiO2


 


1/3/22 17:30  92  192/108    


 


1/3/22 17:19 97.7  18  100 Room Air  





 97.7       











Labs


Labs





Laboratory Tests








Test


 1/3/22


17:25


 


White Blood Count


 5.3 x10^3/uL


(4.0-11.0)


 


Red Blood Count


 4.83 x10^6/uL


(4.30-5.70)


 


Hemoglobin


 13.9 g/dL


(13.0-17.5)


 


Hematocrit


 41.5 %


(39.0-53.0)


 


Mean Corpuscular Volume 86 fL () 


 


Mean Corpuscular Hemoglobin 29 pg (25-35) 


 


Mean Corpuscular Hemoglobin


Concent 34 g/dL


(31-37)


 


Red Cell Distribution Width


 13.9 %


(11.5-14.5)


 


Platelet Count


 256 x10^3/uL


(140-400)


 


Neutrophils (%) (Auto) 53 % (31-73) 


 


Lymphocytes (%) (Auto) 31 % (24-48) 


 


Monocytes (%) (Auto) 11 % (0-9) 


 


Eosinophils (%) (Auto) 4 % (0-3) 


 


Basophils (%) (Auto) 1 % (0-3) 


 


Neutrophils # (Auto)


 2.8 x10^3/uL


(1.8-7.7)


 


Lymphocytes # (Auto)


 1.6 x10^3/uL


(1.0-4.8)


 


Monocytes # (Auto)


 0.6 x10^3/uL


(0.0-1.1)


 


Eosinophils # (Auto)


 0.2 x10^3/uL


(0.0-0.7)


 


Basophils # (Auto)


 0.1 x10^3/uL


(0.0-0.2)


 


Sodium Level


 139 mmol/L


(136-145)


 


Potassium Level


 4.0 mmol/L


(3.5-5.1)


 


Chloride Level


 103 mmol/L


()


 


Carbon Dioxide Level


 29 mmol/L


(21-32)


 


Anion Gap 7 (6-14) 


 


Blood Urea Nitrogen


 13 mg/dL


(8-26)


 


Creatinine


 0.8 mg/dL


(0.7-1.3)


 


Estimated GFR


(Cockcroft-Gault) 96.7 





 


BUN/Creatinine Ratio 16 (6-20) 


 


Glucose Level


 160 mg/dL


(70-99)


 


Calcium Level


 8.8 mg/dL


(8.5-10.1)


 


Magnesium Level


 2.0 mg/dL


(1.8-2.4)


 


Total Bilirubin


 0.2 mg/dL


(0.2-1.0)


 


Aspartate Amino Transf


(AST/SGOT) 33 U/L (15-37) 





 


Alanine Aminotransferase


(ALT/SGPT) 56 U/L (16-63) 





 


Alkaline Phosphatase


 155 U/L


()


 


Troponin I High Sensitivity 36 ng/L (4-75) 


 


NT-Pro-B-Type Natriuretic


Peptide 355 pg/mL


(0-124)


 


Total Protein


 7.7 g/dL


(6.4-8.2)


 


Albumin


 3.5 g/dL


(3.4-5.0)


 


Albumin/Globulin Ratio 0.8 (1.0-1.7) 


 


Lipase


 66 U/L


()








Laboratory Tests








Test


 1/3/22


17:25


 


White Blood Count


 5.3 x10^3/uL


(4.0-11.0)


 


Red Blood Count


 4.83 x10^6/uL


(4.30-5.70)


 


Hemoglobin


 13.9 g/dL


(13.0-17.5)


 


Hematocrit


 41.5 %


(39.0-53.0)


 


Mean Corpuscular Volume 86 fL () 


 


Mean Corpuscular Hemoglobin 29 pg (25-35) 


 


Mean Corpuscular Hemoglobin


Concent 34 g/dL


(31-37)


 


Red Cell Distribution Width


 13.9 %


(11.5-14.5)


 


Platelet Count


 256 x10^3/uL


(140-400)


 


Neutrophils (%) (Auto) 53 % (31-73) 


 


Lymphocytes (%) (Auto) 31 % (24-48) 


 


Monocytes (%) (Auto) 11 % (0-9) 


 


Eosinophils (%) (Auto) 4 % (0-3) 


 


Basophils (%) (Auto) 1 % (0-3) 


 


Neutrophils # (Auto)


 2.8 x10^3/uL


(1.8-7.7)


 


Lymphocytes # (Auto)


 1.6 x10^3/uL


(1.0-4.8)


 


Monocytes # (Auto)


 0.6 x10^3/uL


(0.0-1.1)


 


Eosinophils # (Auto)


 0.2 x10^3/uL


(0.0-0.7)


 


Basophils # (Auto)


 0.1 x10^3/uL


(0.0-0.2)


 


Sodium Level


 139 mmol/L


(136-145)


 


Potassium Level


 4.0 mmol/L


(3.5-5.1)


 


Chloride Level


 103 mmol/L


()


 


Carbon Dioxide Level


 29 mmol/L


(21-32)


 


Anion Gap 7 (6-14) 


 


Blood Urea Nitrogen


 13 mg/dL


(8-26)


 


Creatinine


 0.8 mg/dL


(0.7-1.3)


 


Estimated GFR


(Cockcroft-Gault) 96.7 





 


BUN/Creatinine Ratio 16 (6-20) 


 


Glucose Level


 160 mg/dL


(70-99)


 


Calcium Level


 8.8 mg/dL


(8.5-10.1)


 


Magnesium Level


 2.0 mg/dL


(1.8-2.4)


 


Total Bilirubin


 0.2 mg/dL


(0.2-1.0)


 


Aspartate Amino Transf


(AST/SGOT) 33 U/L (15-37) 





 


Alanine Aminotransferase


(ALT/SGPT) 56 U/L (16-63) 





 


Alkaline Phosphatase


 155 U/L


()


 


Troponin I High Sensitivity 36 ng/L (4-75) 


 


NT-Pro-B-Type Natriuretic


Peptide 355 pg/mL


(0-124)


 


Total Protein


 7.7 g/dL


(6.4-8.2)


 


Albumin


 3.5 g/dL


(3.4-5.0)


 


Albumin/Globulin Ratio 0.8 (1.0-1.7) 


 


Lipase


 66 U/L


()











Images


Images


Chest radiograph:


The heart is enlarged but stable.  There is no pneumothorax or effusion. No air 

space or interstitial disease.





Impression: 


1. No acute cardiopulmonary process.





VTE Prophylaxis Ordered


VTE Prophylaxis Devices:  Yes


VTE Pharmacological Prophylaxi:  Yes





Assessment/Plan


Assessment/Plan


A/P:


Chest pain - with repeat EKG showing changes, will cont heparin GTT, trend 

troponins. consult cardiology. Smoking cessation emphasized. Will give 

nitropaste q6hrs for unstable angina. Protonix for GI PPX


CAD - s/p cath x5 with 9 stents - high risk repeat MI


HLD - statin


HTN - metoprolol, nitropaste


PVD - on ASA, plavix


Smoker - counseled on cessation





FEN - NPO


PPX - heparin


FULL CODE


Dispo - inpatient [facility] for unstable angina





Justifications for Admission


Other Justification














TREVON DA SILVA MD         Nathanael 3, 2022 18:16

## 2022-01-03 NOTE — PHYS DOC
Past Medical History


Past Medical History:  CAD, High Cholesterol, Hypertension, MI


Additional Past Medical Histor:  NSTEMI,PVD


Past Surgical History:  Angioplasty, Other


Additional Past Surgical Histo:  cardiac cath x 5 with 9 stents placed


Smoking Status:  Current Every Day Smoker


Alcohol Use:  None


Drug Use:  None





General Adult


EDM:


Chief Complaint:  CHEST PAIN





HPI:


HPI:





Patient is a 66  year old male who present to ER for evaluation of substernal 

chest pain that radiated to his arm.  Symptoms started at 10 AM this morning.  

Patient denies any cough or fever, no trouble breathing.  Patient denies history

of coronary artery disease in the past, had multiple stents in the past.  

Patient is on blood thinner, plavix.  Patient denies any recent travel 

operation.  Patient is vaccinated for COVID-19.  Patient denies any exposure to 

anybody with COVID-19 infection





Review of Systems:


Review of Systems:


Constitutional:   Denies fever or chills. []


Eyes:   Denies change in visual acuity. []


HENT:   Denies nasal congestion or sore throat. [] 


Respiratory:   Denies cough or shortness of breath. [] 


Cardiovascular:   Positive for chest pain, no edema


GI:   Denies abdominal pain, nausea, vomiting, bloody stools or diarrhea. [] 


:  Denies dysuria. [] 


Musculoskeletal:   Denies back pain or joint pain. [] 


Integument:   Denies rash. [] 


Neurologic:   Denies headache, focal weakness or sensory changes. [] 


Endocrine:   Denies polyuria or polydipsia. [] 


Lymphatic:  Denies swollen glands. [] 


Psychiatric:  Denies depression or anxiety. []





Heart Score:


C/O Chest Pain:  Yes


HEART Score for Chest Pain:  








HEART Score for Chest Pain Response (Comments) Value


 


History Highly Suspicious 2


 


ECG Nonspecific Repolarizatio 1


 


Age > 65 2


 


Risk Factors >3 Risk Factors or Hx CAD 2


 


Troponin < Normal Limit 0


 


Total  7








Risk Factors:


Risk Factors:  DM, Current or recent (<one month) smoker, HTN, HLP, family 

history of CAD, obesity.


Risk Scores:


Score 0 - 3:  2.5% MACE over next 6 weeks - Discharge Home


Score 4 - 6:  20.3% MACE over next 6 weeks - Admit for Clinical Observation


Score 7 - 10:  72.7% MACE over next 6 weeks - Early Invasive Strategies





Current Medications:





Current Medications








 Medications


  (Trade)  Dose


 Ordered  Sig/Bear  Start Time


 Stop Time Status Last Admin


Dose Admin


 


 Nitroglycerin


  (Nitrostat)  0.4 mg  STK-MED ONCE  1/3/22 17:32


 1/3/22 17:32 DC  














Allergies:


Allergies:





Allergies








Coded Allergies Type Severity Reaction Last Updated Verified


 


  No Known Drug Allergies    7/30/15 No











Physical Exam:


PE:





Constitutional: Well developed, well nourished, no acute distress, non-toxic 

appearance. []


HENT: Normocephalic, atraumatic, bilateral external ears normal, oropharynx 

moist, no oral exudates, nose normal. []


Eyes: PERRLA, EOMI, conjunctiva normal, no discharge. [] 


Neck: Normal range of motion, no tenderness, supple, no stridor. [] 


Cardiovascular:Heart rate regular rhythm, no murmur []


Lungs & Thorax:  Bilateral breath sounds clear to auscultation []


Abdomen: Bowel sounds normal, soft, no tenderness, no masses, no pulsatile 

masses. [] 


Skin: Warm, dry, no erythema, no rash. [] 


Back: No tenderness, no CVA tenderness. [] 


Extremities: No tenderness, no cyanosis, no clubbing, ROM intact, no edema. [] 


Neurologic: Alert and oriented X 3, normal motor function, normal sensory 

function, no focal deficits noted. []


Psychologic: Affect normal, judgement normal, mood normal. []





Current Patient Data:


Labs:





Laboratory Tests








Test


 1/3/22


17:25


 


White Blood Count 5.3 x10^3/uL 


 


Red Blood Count 4.83 x10^6/uL 


 


Hemoglobin 13.9 g/dL 


 


Hematocrit 41.5 % 


 


Mean Corpuscular Volume 86 fL 


 


Mean Corpuscular Hemoglobin 29 pg 


 


Mean Corpuscular Hemoglobin


Concent 34 g/dL 





 


Red Cell Distribution Width 13.9 % 


 


Platelet Count 256 x10^3/uL 


 


Neutrophils (%) (Auto) 53 % 


 


Lymphocytes (%) (Auto) 31 % 


 


Monocytes (%) (Auto) 11 % 


 


Eosinophils (%) (Auto) 4 % 


 


Basophils (%) (Auto) 1 % 


 


Neutrophils # (Auto) 2.8 x10^3/uL 


 


Lymphocytes # (Auto) 1.6 x10^3/uL 


 


Monocytes # (Auto) 0.6 x10^3/uL 


 


Eosinophils # (Auto) 0.2 x10^3/uL 


 


Basophils # (Auto) 0.1 x10^3/uL 


 


Sodium Level 139 mmol/L 


 


Potassium Level 4.0 mmol/L 


 


Chloride Level 103 mmol/L 


 


Carbon Dioxide Level 29 mmol/L 


 


Anion Gap 7 


 


Blood Urea Nitrogen 13 mg/dL 


 


Creatinine 0.8 mg/dL 


 


Estimated GFR


(Cockcroft-Gault) 96.7 





 


BUN/Creatinine Ratio 16 


 


Glucose Level 160 mg/dL 


 


Calcium Level 8.8 mg/dL 


 


Magnesium Level 2.0 mg/dL 


 


Total Bilirubin 0.2 mg/dL 


 


Aspartate Amino Transf


(AST/SGOT) 33 U/L 





 


Alanine Aminotransferase


(ALT/SGPT) 56 U/L 





 


Alkaline Phosphatase 155 U/L 


 


Troponin I High Sensitivity 36 ng/L 


 


NT-Pro-B-Type Natriuretic


Peptide 355 pg/mL 





 


Total Protein 7.7 g/dL 


 


Albumin 3.5 g/dL 


 


Albumin/Globulin Ratio 0.8 


 


Lipase 66 U/L 








Current Medications








 Medications


  (Trade)  Dose


 Ordered  Sig/Bear


 Route


 PRN Reason  Start Time


 Stop Time Status Last Admin


Dose Admin


 


 Nitroglycerin


  (Nitrostat)  0.4 mg  PRN Q5MIN  PRN


 SL


 CHEST PAIN  1/3/22 17:45


    1/3/22 17:30





 


 Nitroglycerin


  (Nitrostat)  0.4 mg  STK-MED ONCE


 SL


   1/3/22 17:32


 1/3/22 17:32 DC  











Laboratory Tests








Test


 1/3/22


17:25


 


White Blood Count 5.3 x10^3/uL 


 


Red Blood Count 4.83 x10^6/uL 


 


Hemoglobin 13.9 g/dL 


 


Hematocrit 41.5 % 


 


Mean Corpuscular Volume 86 fL 


 


Mean Corpuscular Hemoglobin 29 pg 


 


Mean Corpuscular Hemoglobin


Concent 34 g/dL 





 


Red Cell Distribution Width 13.9 % 


 


Platelet Count 256 x10^3/uL 


 


Neutrophils (%) (Auto) 53 % 


 


Lymphocytes (%) (Auto) 31 % 


 


Monocytes (%) (Auto) 11 % 


 


Eosinophils (%) (Auto) 4 % 


 


Basophils (%) (Auto) 1 % 


 


Neutrophils # (Auto) 2.8 x10^3/uL 


 


Lymphocytes # (Auto) 1.6 x10^3/uL 


 


Monocytes # (Auto) 0.6 x10^3/uL 


 


Eosinophils # (Auto) 0.2 x10^3/uL 


 


Basophils # (Auto) 0.1 x10^3/uL 


 


Sodium Level 139 mmol/L 


 


Potassium Level 4.0 mmol/L 


 


Chloride Level 103 mmol/L 


 


Carbon Dioxide Level 29 mmol/L 


 


Anion Gap 7 


 


Blood Urea Nitrogen 13 mg/dL 


 


Creatinine 0.8 mg/dL 


 


Estimated GFR


(Cockcroft-Gault) 96.7 





 


BUN/Creatinine Ratio 16 


 


Glucose Level 160 mg/dL 


 


Calcium Level 8.8 mg/dL 


 


Magnesium Level 2.0 mg/dL 


 


Total Bilirubin 0.2 mg/dL 


 


Aspartate Amino Transf


(AST/SGOT) 33 U/L 





 


Alanine Aminotransferase


(ALT/SGPT) 56 U/L 





 


Alkaline Phosphatase 155 U/L 


 


Troponin I High Sensitivity 36 ng/L 


 


NT-Pro-B-Type Natriuretic


Peptide 355 pg/mL 





 


Total Protein 7.7 g/dL 


 


Albumin 3.5 g/dL 


 


Albumin/Globulin Ratio 0.8 


 


Lipase 66 U/L 








Current Medications








 Medications


  (Trade)  Dose


 Ordered  Sig/Bear


 Route


 PRN Reason  Start Time


 Stop Time Status Last Admin


Dose Admin


 


 Nitroglycerin


  (Nitrostat)  0.4 mg  PRN Q5MIN  PRN


 SL


 CHEST PAIN  1/3/22 17:45


    1/3/22 17:30





 


 Nitroglycerin


  (Nitrostat)  0.4 mg  STK-MED ONCE


 SL


   1/3/22 17:32


 1/3/22 17:32 DC  











Vital Signs:





                                   Vital Signs








  Date Time  Temp Pulse Resp B/P (MAP) Pulse Ox O2 Delivery O2 Flow Rate FiO2


 


1/3/22 17:30  92  192/108    











EKG:


EKG:


EKG was done at 1724, heart rate of 95 bpm, sinus rhythm, no ST segment 

elevation.





Radiology/Procedures:


Radiology/Procedures:


[]





Course & Med Decision Making:


Course & Med Decision Making


Pertinent Labs and Imaging studies reviewed. (See chart for details)





Patient is a 66-year-old male who present to ER due to chest pain, patient does 

have history of coronary disease, first EKG was done at 1724, heart rate 95 bpm,

 sinus rhythm, no ST segment elevation appreciated.





Second EKG was done at 1744, heart rate of 91 bpm, sinus rhythm, there is some 

ST segment elevation in lead II and  I,  I discussed with the cardiologist on 

call, Dr. GRUBER who did review the EKG and did not think that patient met 

criteria for STEMI.  He recommended admit patient to the hospital, start patient

 on heparin protocol.





Discussed with Dr. Clemons who agreed to admit the patient.





Dragon Disclaimer:


Dragon Disclaimer:


This electronic medical record was generated, in whole or in part, using a voice

 recognition dictation system.





Departure


Departure


Impression:  


   Primary Impression:  


   Chest pain


   Additional Impression:  


   Acute coronary syndrome


Disposition:  09 ADMITTED AS INPATIENT


Admitting Physician:  ZENA (DR. CLEMONS)


Condition:  IMPROVED


Referrals:  


NO PCP (PCP)











DEBBIE MONTEIRO DO                 Nathanael 3, 2022 17:44

## 2022-01-03 NOTE — RAD
Single AP view of the chest.



Comparison:  8/13/2021.



Indication: Chest pain



Findings: 



The heart is enlarged but stable.  There is no pneumothorax or effusion. No air space or interstitial
 disease.



Impression: 



1. No acute cardiopulmonary process.





Electronically signed by: Tavo Harden MD (1/3/2022 6:05 PM) [facility]

## 2022-01-04 VITALS — SYSTOLIC BLOOD PRESSURE: 141 MMHG | DIASTOLIC BLOOD PRESSURE: 82 MMHG

## 2022-01-04 VITALS — DIASTOLIC BLOOD PRESSURE: 76 MMHG | SYSTOLIC BLOOD PRESSURE: 119 MMHG

## 2022-01-04 VITALS — SYSTOLIC BLOOD PRESSURE: 129 MMHG | DIASTOLIC BLOOD PRESSURE: 71 MMHG

## 2022-01-04 VITALS — SYSTOLIC BLOOD PRESSURE: 126 MMHG | DIASTOLIC BLOOD PRESSURE: 70 MMHG

## 2022-01-04 VITALS — SYSTOLIC BLOOD PRESSURE: 99 MMHG | DIASTOLIC BLOOD PRESSURE: 54 MMHG

## 2022-01-04 VITALS — DIASTOLIC BLOOD PRESSURE: 74 MMHG | SYSTOLIC BLOOD PRESSURE: 127 MMHG

## 2022-01-04 VITALS — SYSTOLIC BLOOD PRESSURE: 129 MMHG | DIASTOLIC BLOOD PRESSURE: 76 MMHG

## 2022-01-04 VITALS — DIASTOLIC BLOOD PRESSURE: 68 MMHG | SYSTOLIC BLOOD PRESSURE: 131 MMHG

## 2022-01-04 VITALS — DIASTOLIC BLOOD PRESSURE: 74 MMHG | SYSTOLIC BLOOD PRESSURE: 114 MMHG

## 2022-01-04 VITALS — SYSTOLIC BLOOD PRESSURE: 137 MMHG | DIASTOLIC BLOOD PRESSURE: 78 MMHG

## 2022-01-04 VITALS — SYSTOLIC BLOOD PRESSURE: 126 MMHG | DIASTOLIC BLOOD PRESSURE: 79 MMHG

## 2022-01-04 VITALS — SYSTOLIC BLOOD PRESSURE: 112 MMHG | DIASTOLIC BLOOD PRESSURE: 74 MMHG

## 2022-01-04 VITALS — DIASTOLIC BLOOD PRESSURE: 66 MMHG | SYSTOLIC BLOOD PRESSURE: 113 MMHG

## 2022-01-04 VITALS — SYSTOLIC BLOOD PRESSURE: 123 MMHG | DIASTOLIC BLOOD PRESSURE: 78 MMHG

## 2022-01-04 VITALS — SYSTOLIC BLOOD PRESSURE: 127 MMHG | DIASTOLIC BLOOD PRESSURE: 67 MMHG

## 2022-01-04 VITALS — SYSTOLIC BLOOD PRESSURE: 123 MMHG | DIASTOLIC BLOOD PRESSURE: 72 MMHG

## 2022-01-04 VITALS — SYSTOLIC BLOOD PRESSURE: 107 MMHG | DIASTOLIC BLOOD PRESSURE: 57 MMHG

## 2022-01-04 VITALS — SYSTOLIC BLOOD PRESSURE: 113 MMHG | DIASTOLIC BLOOD PRESSURE: 66 MMHG

## 2022-01-04 LAB
ANION GAP SERPL CALC-SCNC: 9 MMOL/L (ref 6–14)
BUN SERPL-MCNC: 11 MG/DL (ref 8–26)
CALCIUM SERPL-MCNC: 8.5 MG/DL (ref 8.5–10.1)
CHLORIDE SERPL-SCNC: 105 MMOL/L (ref 98–107)
CHOLEST SERPL-MCNC: 204 MG/DL (ref 0–200)
CHOLEST/HDLC SERPL: 4.5 {RATIO}
CO2 SERPL-SCNC: 27 MMOL/L (ref 21–32)
CREAT SERPL-MCNC: 0.8 MG/DL (ref 0.7–1.3)
ERYTHROCYTE [DISTWIDTH] IN BLOOD BY AUTOMATED COUNT: 14.1 % (ref 11.5–14.5)
GFR SERPLBLD BASED ON 1.73 SQ M-ARVRAT: 96.7 ML/MIN
GLUCOSE SERPL-MCNC: 97 MG/DL (ref 70–99)
HCT VFR BLD CALC: 38.4 % (ref 39–53)
HDLC SERPL-MCNC: 45 MG/DL (ref 40–60)
HGB BLD-MCNC: 12.9 G/DL (ref 13–17.5)
LDLC: 127 MG/DL (ref 0–100)
MCH RBC QN AUTO: 29 PG (ref 25–35)
MCHC RBC AUTO-ENTMCNC: 34 G/DL (ref 31–37)
MCV RBC AUTO: 86 FL (ref 79–100)
PLATELET # BLD AUTO: 227 X10^3/UL (ref 140–400)
POTASSIUM SERPL-SCNC: 4.3 MMOL/L (ref 3.5–5.1)
RBC # BLD AUTO: 4.5 X10^6/UL (ref 4.3–5.7)
SODIUM SERPL-SCNC: 141 MMOL/L (ref 136–145)
TRIGL SERPL-MCNC: 161 MG/DL (ref 0–150)
VLDLC: 32 MG/DL (ref 0–40)
WBC # BLD AUTO: 6.9 X10^3/UL (ref 4–11)

## 2022-01-04 PROCEDURE — B2111ZZ FLUOROSCOPY OF MULTIPLE CORONARY ARTERIES USING LOW OSMOLAR CONTRAST: ICD-10-PCS | Performed by: INTERNAL MEDICINE

## 2022-01-04 PROCEDURE — 4A023N7 MEASUREMENT OF CARDIAC SAMPLING AND PRESSURE, LEFT HEART, PERCUTANEOUS APPROACH: ICD-10-PCS | Performed by: INTERNAL MEDICINE

## 2022-01-04 PROCEDURE — B2151ZZ FLUOROSCOPY OF LEFT HEART USING LOW OSMOLAR CONTRAST: ICD-10-PCS | Performed by: INTERNAL MEDICINE

## 2022-01-04 RX ADMIN — ASPIRIN SCH MG: 325 TABLET, DELAYED RELEASE ORAL at 08:36

## 2022-01-04 RX ADMIN — NITROGLYCERIN SCH INCH: 20 OINTMENT TOPICAL at 19:59

## 2022-01-04 RX ADMIN — NITROGLYCERIN SCH INCH: 20 OINTMENT TOPICAL at 14:23

## 2022-01-04 RX ADMIN — NICOTINE SCH PATCH: 21 PATCH, EXTENDED RELEASE TOPICAL at 08:40

## 2022-01-04 RX ADMIN — NITROGLYCERIN SCH INCH: 20 OINTMENT TOPICAL at 02:38

## 2022-01-04 RX ADMIN — HEPARIN SODIUM PRN MLS/HR: 10000 INJECTION, SOLUTION INTRAVENOUS at 15:28

## 2022-01-04 RX ADMIN — FENTANYL CITRATE PRN MCG: 50 INJECTION INTRAMUSCULAR; INTRAVENOUS at 00:10

## 2022-01-04 RX ADMIN — METOPROLOL TARTRATE SCH MG: 25 TABLET ORAL at 14:24

## 2022-01-04 RX ADMIN — METOPROLOL TARTRATE SCH MG: 25 TABLET ORAL at 20:44

## 2022-01-04 RX ADMIN — FENTANYL CITRATE PRN MCG: 50 INJECTION INTRAMUSCULAR; INTRAVENOUS at 14:22

## 2022-01-04 RX ADMIN — ATORVASTATIN CALCIUM SCH MG: 40 TABLET, FILM COATED ORAL at 20:44

## 2022-01-04 RX ADMIN — NITROGLYCERIN SCH INCH: 20 OINTMENT TOPICAL at 08:45

## 2022-01-04 RX ADMIN — PANTOPRAZOLE SODIUM SCH MG: 40 TABLET, DELAYED RELEASE ORAL at 08:36

## 2022-01-04 RX ADMIN — SODIUM CHLORIDE SCH MLS/HR: 450 INJECTION, SOLUTION INTRAVENOUS at 12:00

## 2022-01-04 RX ADMIN — FENTANYL CITRATE PRN MCG: 50 INJECTION INTRAMUSCULAR; INTRAVENOUS at 08:51

## 2022-01-04 RX ADMIN — FENTANYL CITRATE PRN MCG: 50 INJECTION INTRAMUSCULAR; INTRAVENOUS at 19:59

## 2022-01-04 NOTE — NUR
SS following for discharge planning. SS reviewed pt chart and discussed with pt RN. Pt is 
from home with spouse and is currently on room air. COVID19 negative on rapid test. 
Cardiology consulted. Pt had heart cath today. SS will continue to follow for discharge 
planning. 

-------------------------------------------------------------------------------

Addendum: 01/04/22 at 1630 by PARK MICHELLE SS

-------------------------------------------------------------------------------

Per Cardiology, pt needing transfer for heart surgery. Pt accepted at [facility] per [facility] transfer center, [phone]; fax [phone]. Accepting physician Dr. Owens. Currently awaiting bed availability at this time. SS and Cardiology discussed with pt 
and family in room. Pt and family adamantly refusing transfer to [facility]. Pt and family 
requested transfers to , [facility], and [facility]. All hospitals 
contacted and pt denied for transfer. SS and Cardiology revisited with pt and family and pt 
and family continue to refuse transfer to [facility]. Dr. Rascon contacting pt and family to 
discuss. Pt's RN notified.

## 2022-01-04 NOTE — NUR
spoke with Dean and his wife regarding possible to transfer to [facility] for 
poss CABG. At this time; he does not want to transfer. wife supports his decision. He was 
informed that [facility] is only taking time sensitive cases, [facility]'s and [facility] are on diversion and 
not accepting patients. spoke with Geeta in the transfer center for [facility] and if he decides to 
transfer to call [phone]

## 2022-01-04 NOTE — EKG
[facility]

              [address] [place] [address]

Test Date:    2022               Test Time:    17:24:05

Pat Name:     VIOLA HIGGINS           Department:   

Patient ID:   [id number]           Room:         [facility]

Gender:       M                        Technician:   

:          [id number]               Requested By: DEBBIE MONTEIRO

Order Number: [id number]           Reading MD:   David Rascon

                                 Measurements

Intervals                              Axis          

Rate:         95                       P:            90

NY:           152                      QRS:          -38

QRSD:         108                      T:            74

QT:           340                                    

QTc:          430                                    

                           Interpretive Statements

SINUS RHYTHM

ATRIAL PREMATURE COMPLEX(ES)

ABNORMAL LEFT AXIS DEVIATION

LEFT ANTERIOR FASCICULAR BLOCK

LVH WITH REPOLARIZATION ABNORMALITY

Electronically Signed On 2022 15:16:30 CST by David Rascon

## 2022-01-04 NOTE — EKG
[facility]

              [address] [place] [address]

Test Date:    2022               Test Time:    20:16:56

Pat Name:     VIOLA HIGGINS           Department:   

Patient ID:   [id number]           Room:         [facility]

Gender:       M                        Technician:   

:          [id number]               Requested By: DEBBIE MONTEIRO

Order Number: [id number]           Reading MD:   David Rascon

                                 Measurements

Intervals                              Axis          

Rate:         77                       P:            39

TN:           146                      QRS:          -36

QRSD:         108                      T:            50

QT:           386                                    

QTc:          439                                    

                           Interpretive Statements

SINUS RHYTHM

LEFT ATRIAL ABNORMALITY

LEFT ANTERIOR FASCICULAR BLOCK

Electronically Signed On 2022 15:13:30 CST by David Rascon

## 2022-01-04 NOTE — NUR
in bathroom. unable to remove TR band; passed to next shift. Heparin continues as same rate. 
last ufh was 0.35--call for no change in rate. next UHF p.laced for 0500 in am.

## 2022-01-04 NOTE — PDOC2
DUNCAN NEFF APRN 1/4/22 0928:


CARDIAC CONSULT


DATE OF CONSULT


Date of Consult


DATE: 1/4/22 


TIME: 09:22





REASON FOR CONSULT


Reason for Consult:


Chest pain





REFERRING PHYSICIAN


Referring Physician:


[place]





SOURCE


Source:  Chart review, Patient





HISTORY OF PRESENT ILLNESS


HISTORY OF PRESENT ILLNESS


This is a pleasant 65 yo male admitted for complains of chest pain. Reports of 

SIDDIQUI. Has been having sensation of indigestion but no nausea or vomiting. Denies 

any palpitations but has been having stabbing chest pain that radiates to his 

jaw and arms. No recent falls or injury. No flu like symptoms and has been fully

vaccinated for covid-19. Has been ASA but has not been taking plavix and statin 

for over a year and just decided to stop taking it. He follows with Dr. Garcia.

He continues to smoke tobacco as well.





PAST MEDICAL HISTORY


Cardiovascular:  CAD, HTN, Hyperlipidemia


Pulmonary:  No pertinent hx


CENTRAL NERVOUS SYSTEM:  Other (No pertinent history)


GI:  GERD


Heme/Onc:  No pertinent hx


Psych:  No pertinent hx


Musculoskeletal:  Osteoarthritis


Infectious disease:  No pertinent hx


ENT:  No pertinent hx


Renal/:  No pertinent hx





PAST SURGICAL HISTORY


Past Surgical History:  Other (PCI)





FAMILY HISTORY


Family History:  Coronary Artery Disease





SOCIAL HISTORY


Smoke:  <1 pack per day


ALCOHOL:  none


Drugs:  None


Lives:  with Family





CURRENT MEDICATIONS


CURRENT MEDICATIONS





Current Medications








 Medications


  (Trade)  Dose


 Ordered  Sig/Bear


 Route


 PRN Reason  Start Time


 Stop Time Status Last Admin


Dose Admin


 


 Nitroglycerin


  (Nitrostat)  0.4 mg  PRN Q5MIN  PRN


 SL


 CHEST PAIN  1/3/22 17:45


    1/3/22 17:30





 


 Heparin Sodium


  (Porcine)


  (Heparin Sodium)  4,000 unit  1X  ONCE


 IV


   1/3/22 18:15


 1/3/22 18:16 DC 1/3/22 18:52





 


 Heparin Sodium/


 Dextrose  250 ml @ 


 12.24 mls/


 hr  CONT  PRN


 IV


 PER PROTOCOL  1/3/22 18:15


    1/3/22 18:51





 


 Heparin Sodium


  (Porcine)


  (Heparin Sodium)  2,550 unit  PRN Q6HRS  PRN


 IV


 FOR UFH LEVEL LESS THAN 0.2  1/3/22 18:15


    1/4/22 02:15





 


 Fentanyl Citrate


  (Fentanyl 2ml


 Vial)  25 mcg  PRN Q3HRS  PRN


 IVP


 SEVERE PAIN 7-10  1/3/22 19:45


    1/4/22 08:51





 


 Nitroglycerin


  (Nitro-Bid Oint)  1 inch  1X  ONCE


 TP


   1/3/22 19:45


 1/3/22 19:50 DC 1/3/22 20:27





 


 Pantoprazole


 Sodium


  (Protonix)  40 mg  DAILY07


 PO


   1/3/22 20:45


    1/4/22 08:36





 


 Nitroglycerin


  (Nitro-Bid Oint)  1 inch  Q6HRS


 TP


   1/4/22 00:00


    1/4/22 08:45





 


 Aspirin


  (Ecotrin)  325 mg  DAILYWBKFT


 PO


   1/4/22 08:00


    1/4/22 08:36





 


 Nicotine


  (Nicoderm Cq


 21mg)  1 patch  DAILY


 TD


   1/4/22 09:00


    1/4/22 08:40














ALLERGIES


ALLERGIES:  


Coded Allergies:  


     No Known Drug Allergies (Unverified , 7/30/15)





ROS


Review of System


14 point ROS evaluated with pertinent positives noted per HPI





PHYSICAL EXAM


General:  Alert, Oriented X3, Cooperative, No acute distress


HEENT:  Atraumatic, Mucous membr. moist/pink


Lungs:  Clear to auscultation, Normal air movement


Heart:  Regular rate (SR), Normal S1, Normal S2, No murmurs


Abdomen:  Soft, No tenderness


Extremities:  No cyanosis, Other (2+ bilateral LE pitting edema)


Skin:  No breakdown, No significant lesion


Neuro:  Normal speech, Sensation intact


Psych/Mental Status:  Mental status NL, Mood NL


MUSCULOSKELETAL:  Full range of motion without pain





VITALS/I&O


VITALS/I&O:





                                   Vital Signs








  Date Time  Temp Pulse Resp B/P (MAP) Pulse Ox O2 Delivery O2 Flow Rate FiO2


 


1/4/22 08:51   16   Room Air  


 


1/4/22 08:45  77  99/57    


 


1/4/22 07:20 97.7    94   





 97.7       














                                    I & O   


 


 1/3/22 1/3/22 1/4/22





 15:00 23:00 07:00


 


Intake Total   0 ml


 


Balance   0 ml











LABS


Lab:





                                Laboratory Tests








Test


 1/3/22


17:25 1/3/22


19:29 1/4/22


00:50 1/4/22


07:30


 


White Blood Count


 5.3 x10^3/uL


(4.0-11.0) 


 


 





 


Red Blood Count


 4.83 x10^6/uL


(4.30-5.70) 


 


 





 


Hemoglobin


 13.9 g/dL


(13.0-17.5) 


 


 





 


Hematocrit


 41.5 %


(39.0-53.0) 


 


 





 


Mean Corpuscular Volume


 86 fL ()


 


 


 





 


Mean Corpuscular Hemoglobin 29 pg (25-35)     


 


Mean Corpuscular Hemoglobin


Concent 34 g/dL


(31-37) 


 


 





 


Red Cell Distribution Width


 13.9 %


(11.5-14.5) 


 


 





 


Platelet Count


 256 x10^3/uL


(140-400) 


 


 





 


Neutrophils (%) (Auto) 53 % (31-73)     


 


Lymphocytes (%) (Auto) 31 % (24-48)     


 


Monocytes (%) (Auto) 11 % (0-9)  H   


 


Eosinophils (%) (Auto) 4 % (0-3)  H   


 


Basophils (%) (Auto) 1 % (0-3)     


 


Neutrophils # (Auto)


 2.8 x10^3/uL


(1.8-7.7) 


 


 





 


Lymphocytes # (Auto)


 1.6 x10^3/uL


(1.0-4.8) 


 


 





 


Monocytes # (Auto)


 0.6 x10^3/uL


(0.0-1.1) 


 


 





 


Eosinophils # (Auto)


 0.2 x10^3/uL


(0.0-0.7) 


 


 





 


Basophils # (Auto)


 0.1 x10^3/uL


(0.0-0.2) 


 


 





 


Prothrombin Time


 11.8 SEC


(11.7-14.0) 


 


 





 


Prothrombin Time INR 0.9 (0.8-1.1)     


 


Activated Partial


Thromboplast Time 32 SEC (24-38)


 


 


 





 


Sodium Level


 139 mmol/L


(136-145) 


 


 





 


Potassium Level


 4.0 mmol/L


(3.5-5.1) 


 


 





 


Chloride Level


 103 mmol/L


() 


 


 





 


Carbon Dioxide Level


 29 mmol/L


(21-32) 


 


 





 


Anion Gap 7 (6-14)     


 


Blood Urea Nitrogen


 13 mg/dL


(8-26) 


 


 





 


Creatinine


 0.8 mg/dL


(0.7-1.3) 


 


 





 


Estimated GFR


(Cockcroft-Gault) 96.7  


 


 


 





 


BUN/Creatinine Ratio 16 (6-20)     


 


Glucose Level


 160 mg/dL


(70-99)  H 


 


 





 


Calcium Level


 8.8 mg/dL


(8.5-10.1) 


 


 





 


Magnesium Level


 2.0 mg/dL


(1.8-2.4) 


 


 





 


Total Bilirubin


 0.2 mg/dL


(0.2-1.0) 


 


 





 


Aspartate Amino Transferase


(AST) 33 U/L (15-37)


 


 


 





 


Alanine Aminotransferase (ALT)


 56 U/L (16-63)


 


 


 





 


Alkaline Phosphatase


 155 U/L


()  H 


 


 





 


Troponin I High Sensitivity


 36 ng/L (4-75)


 455 ng/L


(4-75)  H 3981 ng/L


(4-75)  H 





 


NT-Pro-B-Type Natriuretic


Peptide 355 pg/mL


(0-124)  H 


 


 





 


Total Protein


 7.7 g/dL


(6.4-8.2) 


 


 





 


Albumin


 3.5 g/dL


(3.4-5.0) 


 


 





 


Albumin/Globulin Ratio


 0.8 (1.0-1.7)


L 


 


 





 


Lipase


 66 U/L


()  L 


 


 





 


Heparin Anti-Xa Act,


Unfractionated 


 


 < 0.10 IU/mL


(0.30-0.70)  L 





 


SARS-CoV-2 Antigen (Rapid)


 


 


 


 Negative


(NEGATIVE)





                                Laboratory Tests


1/3/22 17:25








                                Laboratory Tests


1/3/22 17:25











ECHOCARDIOGRAM


ECHOCARDIOGRAM





<Conclusion>


The left ventricle is normal size.


The left ventricular systolic function is normal and the ejection fraction is 

within normal range. 


LV ejection fraction 50-55%.


There is normal left ventricular wall thickness.


Doppler and Color Flow revealed mild aortic regurgitation.


There is no significant aortic valvular stenosis.


Doppler and Color-flow revealed mild mitral regurgitation.


Doppler and Color Flow revealed no tricuspid valve regurgitation noted.





DATE:     08/16/21 [id number] 0





HEART CATH


HEART CATH


Conclusion


1.  Severe coronary artery disease as described above with patent stents in the 

right coronary artery, 40% in-stent restenosis in the left anterior descending 

artery and 95% thrombotic in-stent stenosis in the diagonal branch.  The left 

circumflex artery showed chronic total occlusion in the midsegment.  A long 

lower division of the diagonal branch showed chronic total occlusion proximally 

with distal reconstitution from left to left collaterals.


2.  Successful PCI/drug-eluting stent placement to the left anterior descending 

artery and successful PTCA to the diagonal branch.





Recommendations


1.  Aspirin 325 mg daily for 1 month followed by 81 mg daily


2.  Plavix 75 mg daily


3.  Cardiovascular risk factor modification and cardiac rehabilitation referral





DATE:     12/07/20 [id number] 0





ASSESSMENT/PLAN


ASSESSMENT/PLAN


1. NSTEMI


2. CAD: past PCI


3. HTN


4. HLP


5. Tobaccoism


6. Obesity





Recommendations


1. LHC today with possible PCI, risks and benefits discussed and agreeable to 

proceed


2. Discussed smoking cessation and treatment compliance as he has not taken 

statin and plavix for over a yr


3. FLP, TSH


4. ASA, heparin drip ongoing,. Secondary prevention measures.





TYRA GRUBER MD 1/5/22 0559:


CARDIAC CONSULT


ASSESSMENT/PLAN


ASSESSMENT/PLAN


Patient seen and examined. Agree with NP's assessment and plan


Patient with history of CAD s/p multiple PCI/stents placement presently admitted

with NSTEMI


Plan for cardiac cath and possible PCI


Continue heparin infusion per protocol


Importance of compliance with meds and smoking cessation reemphasized


Thank you for your consultation











DUNCAN NEFF           Jan 4, 2022 09:28


TYRA GRUBER MD            Jan 5, 2022 05:59

## 2022-01-04 NOTE — RAD
EXAM: Bilateral carotid duplex with waveform analysis.



CLINICAL HISTORY: Reason: pre-op eval / Spl. Instructions:  / History: . 



TECHNIQUE: Longitudinal and transverse sonographic images of the bilateral carotid arteries was perfo
rmed utilizing grayscale, color and spectral Doppler techniques.



COMPARISON: None



FINDINGS:

Right Carotid: Moderate plaque and wall thickening at the carotid bulb with no hemodynamically signif
icant stenosis

Left Carotid: Moderate plaque and wall thickening at the carotid bulb with no hemodynamically signifi
cant stenosis

Vertebrals: Antegrade flow bilaterally.



____________________________________



Right:

PSV CCA (cm/s):   77

PSV ICA (cm/s):    73

EDV ICA (cm/s):    27

PSV ECA (cm/s):    131

ICA/CCA Ratio:    0.75



Left:

PSV CCA (cm/s):   95

PSV ICA (cm/s):    82

EDV ICA (cm/s):    31

PSV ECA (cm/s):    120

ICA/CCA Ratio:    0.78



____________________________________





  

IMPRESSION:    

Moderate smooth plaque at the carotid bulbs with no evidence of hemodynamically significant stenosis 
of bilateral internal carotid arteries







Consensus Panel Gray-scale and Doppler US Criteria for Diagnosis of ICA Stenosis

Degree of Stenosis (%)  ICA PSV

(Cm/sec)  Plaque Estimate (%)*

Normal  <125  None

<50  <125  <50

50-69  125-230  >50

>70 but 

< near occlusion  >230  >50

Near occlusion  High, low, or undetectable  Visible

Total occlusion  Undetectable  Visible, no detectable lumen

*Plaque estimate (diameter reduction) with gray-scale and color Doppler US



Degree of Stenosis (%)  ICA/CCA PSV Ratio     ICA EDV

(cm/sec)

Normal  <2.0  <40

<50  <2.0  <40

50-69  2.0-4.0  

>70 but 

< near occlusion  >4.0  >100

Near occlusion  Variable  Variable

Total occlusion  Not applicable  Not applicable



Electronically signed by: Brijesh Khan DO (1/4/2022 1:58 PM) [id number]

## 2022-01-04 NOTE — PDOC
[facility] PROGRESS NOTE


Date of Service


DOS:


DATE: 1/4/22 


TIME: 12:13





Chief Complaint


Chief Complaint


Assessment/Plan


A/P:


Chest pain - with repeat EKG showing changes, will cont heparin GTT, trend 

troponins. consult cardiology. Smoking cessation emphasized. Will give 

nitropaste q6hrs for unstable angina. Protonix for GI PPX


CAD - s/p cath x5 with 9 stents - high risk repeat MI. Pending cardiac cath 

today.


HLD - statin


HTN - metoprolol, nitropaste


PVD - on ASA, plavix


Smoker - counseled on cessation





FEN - NPO


PPX - heparin


FULL CODE


Dispo - inpatient CVC for unstable angina





History of Present Illness


History of Present Illness


 66  year old male with PMHx CAD (s/p cath x5 with 9 stents), HLD, HTN, PVD, 

smoker who presents to ED c/o sub-sternal chest pain that radiates down both of 

his arms that began at 1645 while at rest and was continuous, He notes this was 

similar to prior MI. He experienced some discomfort at 1000 this morning but it 

resolved on its own. He does intermittently experience anginal pain but it is 

generally remitting without treatment 1-3 times per month.





1/4/2022


No acute events overnight. Patient seen examined bedside. Patient taken to the 

Cath Lab for cardiac cath. Rising troponins. Patient's chart, labs, images were 

reviewed and discussed with RN





Vitals/I&O


Vitals/I&O:





                                   Vital Signs








  Date Time  Temp Pulse Resp B/P (MAP) Pulse Ox O2 Delivery O2 Flow Rate FiO2


 


1/4/22 11:36  86 16  99 Nasal Cannula 2.0 


 


1/4/22 08:45    99/57    


 


1/4/22 07:20 97.7       





 97.7       














                                    I & O   


 


 1/3/22 1/3/22 1/4/22





 15:00 23:00 07:00


 


Intake Total   0 ml


 


Balance   0 ml











Physical Exam


General:  Alert, Oriented X3, Cooperative, No acute distress


Heart:  Regular rate (SR), Normal S1, Normal S2, No murmurs


Lungs:  Clear


Abdomen:  Soft, No tenderness


Extremities:  No cyanosis, Other (2+ bilateral LE pitting edema)


Skin:  No breakdown, No significant lesion





Labs


Labs:





Laboratory Tests








Test


 1/3/22


17:25 1/3/22


19:29 1/4/22


00:50 1/4/22


07:30


 


White Blood Count


 5.3 x10^3/uL


(4.0-11.0) 


 


 





 


Red Blood Count


 4.83 x10^6/uL


(4.30-5.70) 


 


 





 


Hemoglobin


 13.9 g/dL


(13.0-17.5) 


 


 





 


Hematocrit


 41.5 %


(39.0-53.0) 


 


 





 


Mean Corpuscular Volume 86 fL ()    


 


Mean Corpuscular Hemoglobin 29 pg (25-35)    


 


Mean Corpuscular Hemoglobin


Concent 34 g/dL


(31-37) 


 


 





 


Red Cell Distribution Width


 13.9 %


(11.5-14.5) 


 


 





 


Platelet Count


 256 x10^3/uL


(140-400) 


 


 





 


Neutrophils (%) (Auto) 53 % (31-73)    


 


Lymphocytes (%) (Auto) 31 % (24-48)    


 


Monocytes (%) (Auto) 11 % (0-9)    


 


Eosinophils (%) (Auto) 4 % (0-3)    


 


Basophils (%) (Auto) 1 % (0-3)    


 


Neutrophils # (Auto)


 2.8 x10^3/uL


(1.8-7.7) 


 


 





 


Lymphocytes # (Auto)


 1.6 x10^3/uL


(1.0-4.8) 


 


 





 


Monocytes # (Auto)


 0.6 x10^3/uL


(0.0-1.1) 


 


 





 


Eosinophils # (Auto)


 0.2 x10^3/uL


(0.0-0.7) 


 


 





 


Basophils # (Auto)


 0.1 x10^3/uL


(0.0-0.2) 


 


 





 


Prothrombin Time


 11.8 SEC


(11.7-14.0) 


 


 





 


Prothromb Time International


Ratio 0.9 (0.8-1.1) 


 


 


 





 


Activated Partial


Thromboplast Time 32 SEC (24-38) 


 


 


 





 


Sodium Level


 139 mmol/L


(136-145) 


 


 





 


Potassium Level


 4.0 mmol/L


(3.5-5.1) 


 


 





 


Chloride Level


 103 mmol/L


() 


 


 





 


Carbon Dioxide Level


 29 mmol/L


(21-32) 


 


 





 


Anion Gap 7 (6-14)    


 


Blood Urea Nitrogen


 13 mg/dL


(8-26) 


 


 





 


Creatinine


 0.8 mg/dL


(0.7-1.3) 


 


 





 


Estimated GFR


(Cockcroft-Gault) 96.7 


 


 


 





 


BUN/Creatinine Ratio 16 (6-20)    


 


Glucose Level


 160 mg/dL


(70-99) 


 


 





 


Calcium Level


 8.8 mg/dL


(8.5-10.1) 


 


 





 


Magnesium Level


 2.0 mg/dL


(1.8-2.4) 


 


 





 


Total Bilirubin


 0.2 mg/dL


(0.2-1.0) 


 


 





 


Aspartate Amino Transf


(AST/SGOT) 33 U/L (15-37) 


 


 


 





 


Alanine Aminotransferase


(ALT/SGPT) 56 U/L (16-63) 


 


 


 





 


Alkaline Phosphatase


 155 U/L


() 


 


 





 


Troponin I High Sensitivity


 36 ng/L (4-75) 


 455 ng/L


(4-75) 3981 ng/L


(4-75) 





 


NT-Pro-B-Type Natriuretic


Peptide 355 pg/mL


(0-124) 


 


 





 


Total Protein


 7.7 g/dL


(6.4-8.2) 


 


 





 


Albumin


 3.5 g/dL


(3.4-5.0) 


 


 





 


Albumin/Globulin Ratio 0.8 (1.0-1.7)    


 


Lipase


 66 U/L


() 


 


 





 


Heparin Anti-Xa Act,


Unfractionated 


 


 < 0.10 IU/mL


(0.30-0.70) 





 


SARS-CoV-2 Antigen (Rapid)


 


 


 


 Negative


(NEGATIVE)


 


Test


 1/4/22


09:15 


 


 





 


White Blood Count


 6.9 x10^3/uL


(4.0-11.0) 


 


 





 


Red Blood Count


 4.50 x10^6/uL


(4.30-5.70) 


 


 





 


Hemoglobin


 12.9 g/dL


(13.0-17.5) 


 


 





 


Hematocrit


 38.4 %


(39.0-53.0) 


 


 





 


Mean Corpuscular Volume 86 fL ()    


 


Mean Corpuscular Hemoglobin 29 pg (25-35)    


 


Mean Corpuscular Hemoglobin


Concent 34 g/dL


(31-37) 


 


 





 


Red Cell Distribution Width


 14.1 %


(11.5-14.5) 


 


 





 


Platelet Count


 227 x10^3/uL


(140-400) 


 


 





 


Heparin Anti-Xa Act,


Unfractionated 0.32 IU/mL


(0.30-0.70) 


 


 





 


Sodium Level


 141 mmol/L


(136-145) 


 


 





 


Potassium Level


 4.3 mmol/L


(3.5-5.1) 


 


 





 


Chloride Level


 105 mmol/L


() 


 


 





 


Carbon Dioxide Level


 27 mmol/L


(21-32) 


 


 





 


Anion Gap 9 (6-14)    


 


Blood Urea Nitrogen


 11 mg/dL


(8-26) 


 


 





 


Creatinine


 0.8 mg/dL


(0.7-1.3) 


 


 





 


Estimated GFR


(Cockcroft-Gault) 96.7 


 


 


 





 


Glucose Level


 97 mg/dL


(70-99) 


 


 





 


Calcium Level


 8.5 mg/dL


(8.5-10.1) 


 


 





 


Triglycerides Level


 161 mg/dL


(0-150) 


 


 





 


Cholesterol Level


 204 mg/dL


(0-200) 


 


 





 


LDL Cholesterol, Calculated


 127 mg/dL


(0-100) 


 


 





 


VLDL Cholesterol, Calculated


 32 mg/dL


(0-40) 


 


 





 


Non-HDL Cholesterol Calculated


 159 mg/dL


(0-129) 


 


 





 


HDL Cholesterol


 45 mg/dL


(40-60) 


 


 





 


Cholesterol/HDL Ratio 4.5    


 


Thyroid Stimulating Hormone


(TSH) 1.723 uIU/mL


(0.358-3.74) 


 


 














Assessment and Plan


Assessmemt and Plan


Problems


Medical Problems:


(1) Acute coronary syndrome


Status: Acute  





(2) Chest pain


Status: Acute  











Comment


Review of Relevant


I have reviewed the following items yimi (where applicable) has been applied.


Medications:





Current Medications








 Medications


  (Trade)  Dose


 Ordered  Sig/Bear


 Route


 PRN Reason  Start Time


 Stop Time Status Last Admin


Dose Admin


 


 Nitroglycerin


  (Nitrostat)  0.4 mg  PRN Q5MIN  PRN


 SL


 CHEST PAIN  1/3/22 17:45


    1/3/22 17:30





 


 Heparin Sodium


  (Porcine)


  (Heparin Sodium)  4,000 unit  1X  ONCE


 IV


   1/3/22 18:15


 1/3/22 18:16 DC 1/3/22 18:52





 


 Heparin Sodium/


 Dextrose  250 ml @ 


 12.24 mls/


 hr  CONT  PRN


 IV


 PER PROTOCOL  1/3/22 18:15


    1/3/22 18:51





 


 Heparin Sodium


  (Porcine)


  (Heparin Sodium)  2,550 unit  PRN Q6HRS  PRN


 IV


 FOR UFH LEVEL LESS THAN 0.2  1/3/22 18:15


    1/4/22 02:15





 


 Fentanyl Citrate


  (Fentanyl 2ml


 Vial)  25 mcg  PRN Q3HRS  PRN


 IVP


 SEVERE PAIN 7-10  1/3/22 19:45


    1/4/22 08:51





 


 Nitroglycerin


  (Nitro-Bid Oint)  1 inch  1X  ONCE


 TP


   1/3/22 19:45


 1/3/22 19:50 DC 1/3/22 20:27





 


 Pantoprazole


 Sodium


  (Protonix)  40 mg  DAILY07


 PO


   1/3/22 20:45


    1/4/22 08:36





 


 Nitroglycerin


  (Nitro-Bid Oint)  1 inch  Q6HRS


 TP


   1/4/22 00:00


    1/4/22 08:45





 


 Aspirin


  (Ecotrin)  325 mg  DAILYWBKFT


 PO


   1/4/22 08:00


    1/4/22 08:36





 


 Nicotine


  (Nicoderm Cq


 21mg)  1 patch  DAILY


 TD


   1/4/22 09:00


    1/4/22 08:40





 


 Sodium Chloride  1,000 ml @ 


 100 mls/hr  1X  ONCE


 IV


   1/4/22 10:30


 1/4/22 20:29  1/4/22 10:30





 


 Nitroglycerin


  (Nitroglycerin)  200 mcg  1X  ONCE


 IART


   1/4/22 11:15


 1/4/22 11:16 DC 1/4/22 11:15





 


 Verapamil HCl


  (Verapamil)  2.5 mg  1X  ONCE


 IART


   1/4/22 11:15


 1/4/22 11:16 DC 1/4/22 11:15





 


 Heparin Sodium


  (Porcine)


  (Heparin Sodium)  2,500 unit  1X  ONCE


 IART


   1/4/22 11:15


 1/4/22 11:16 DC 1/4/22 11:15





 


 Heparin Sodium/


 Sodium Chloride


  (HEPARIN for


 ARTERIAL LINE


 FLUSH)  1,000 unit  1X  ONCE


 IART


   1/4/22 11:15


 1/4/22 11:16 DC 1/4/22 11:15





 


 Heparin Sodium/


 Sodium Chloride


  (HEPARIN for


 ARTERIAL LINE


 FLUSH)  1,000 unit  1X  ONCE


 IART


   1/4/22 11:15


 1/4/22 11:16 DC 1/4/22 11:15





 


 Midazolam HCl


  (Versed)  2 mg  1X  ONCE


 IV


   1/4/22 11:15


 1/4/22 11:16 DC 1/4/22 11:10





 


 Fentanyl Citrate


  (Fentanyl 2ml


 Vial)  100 mcg  1X  ONCE


 IV


   1/4/22 11:15


 1/4/22 11:16 DC 1/4/22 11:10





 


 Iodixanol


  (Visipaque 320)  100 ml  1X  ONCE


 IART


   1/4/22 11:15


 1/4/22 11:16 DC 1/4/22 11:30





 


 Lidocaine HCl


  (Xylocaine-Mpf


 1% 2ml Vial)  2 ml  1X  ONCE


 INJ


   1/4/22 11:15


 1/4/22 11:16 DC 1/4/22 11:14














Justifications for Admission


Other Justification














BRYAN STAFFORD MD                   Jan 4, 2022 12:16

## 2022-01-04 NOTE — RAD
EXAMINATION: US VENOUS MAPPING BILAT



INDICATION: Reason: pre-op CABG / Spl. Instructions:  / History: 



COMPARISON: None



TECHNIQUE: Grayscale, color and spectral Doppler evaluation of the bilateral lower extremity venous s
ystem(s) was performed for venous mapping.



FINDINGS:

RIGHT:

DEEP VEINS:    

GREAT SAPHENOUS VEIN FLOW: Patent

RIGHT GREAT SAPHENOUS VEIN DIAMETERS: 0.7 cm

PROXIMAL THIGH: 0.6 cm 

MID THIGH: 0.34 cm

DISTAL THIGH: 0.24 cm 

PROXIMAL CALF: 0.3 cm 

MID CALF: 0.24 cm 

DISTAL CALF: 0.28 cm 



LEFT:

DEEP VEINS:    

GREAT SAPHENOUS VEIN FLOW: Patent

LEFT GREAT SAPHENOUS VEIN DIAMETERS: 0.67 cm

PROXIMAL THIGH: 0.6 cm   

MID THIGH:    0.43 cm

DISTAL THIGH:  0.4 cm  

PROXIMAL CALF:  0.32cm  

MID CALF:    0.26cm

DISTAL CALF:    0.33cm



IMPRESSION:

Bilateral greater saphenous and superficial saphenous veins are patent and free of clot. Measurements
 of bilateral lower extremity greater saphenous veins as above.



Electronically signed by: Brijesh Khan DO (1/4/2022 1:50 PM) [id number]

## 2022-01-04 NOTE — NUR
returned from cardiac cath. he answers questions but is drowsy. has o2 on at 2l. IVF 
resumed. TR band to right wrist area. fingers warms to touch. no complaints at this time.

## 2022-01-04 NOTE — CARD
MR#: [id number]

Account#: [id number]

Accession#: [id number]

Date of Study: 01/04/2022

Ordering Physician: TYRA RASCON, 

Referring Physician: TYRA RASCON 

Tech: RT Brynn(R)





--------------- APPROVED REPORT --------------





Technologist: Carolyn Castañeda RT(R)

Nurse: Marietta Johnson RN



Procedure(s) performed: Left heart catheterization, selective coronary angiography and left ventricul
ography via right transradial approach



FLUORO TIME: 2.6 MIN

DOSE: 65 Gycm2

Contrast: 94ml

Mod Sedation: 30 MINS



INDICATION

The indication(s) include : non-STEMI .



[facility] Clinical Frailty Scale

[facility] Clinical Frailty Scale: Moderately Frail



Heart Failure

Heart Failure: No



CASE TECHNIQUE

IV conscious sedation was used throughout procedure with appropriate monitoring and was performed in 
the presence of a registered nurse who was an independent trained observer other than the physician p
erforming the procedure. During this case, Fluoroscopy and low osmolar contrast were used for imaging
. Specimen(s) Removed: No Estimated Blood loss: 15 cc's.



PROCEDURE NARRATIVE

After explaining the risks, benefits and alternative options, informed consent was obtained from cher
ent.  Patient was brought to the cardiac Cath Lab and right wrist was prepped and draped in the usual
 fashion after confirming a positive modified Osorio's test.  Arterial access was obtained in the righ
t radial artery and a 6 Dutch sheath was inserted.  6 Dutch Don catheter was used to perform binh
ective angiography of the left and right coronary arteries.  6 Dutch pigtail catheter was used to pe
rform left ventriculography.  Patient tolerated the procedure well.  Hemostasis was achieved using TR
 band.  There were no immediate complications.  The following findings were noted.



FINDINGS

1.  Hemodynamics: Left ventricular end-diastolic pressure of 17 mmHg.  No pullback gradient across th
e aortic valve.

2.  Left ventriculography: Hypokinesis of the mid anterolateral wall and the diaphragmatic wall with 
ejection fraction estimated at 45%.  No significant mitral regurgitation seen.

3.  Coronary angiography:

a.  The left main coronary artery arose from the left sinus of Valsalva, gave rise to the left anteri
or descending and left circumflex arteries and showed 20% stenosis in the distal segment.

b.  The left anterior descending artery showed 80% in-stent restenosis in the mid segment.  The first
 diagonal branch showed 100% in-stent occlusion in the proximal to mid segment.  A proximal branch of
 this vessel showed 100% chronic total occlusion with very good distal reconstitution from left to le
ft collaterals, described in prior cardiac catheterization.

c.  The left circumflex artery showed 70% stenosis in the proximal segment and 100% chronic total occ
lusion in the midsegment with faint distal reconstitution from right to left collaterals.  This was d
escribed in prior cardiac catheterizations.

d.  The right coronary artery was a large and dominant vessel arising from the right sinus of Valsalv
a that showed widely patent stent in the proximal to mid segment and a 90 to 95% in-stent restenosis 
in the mid to distal segment.  The posterolateral branch showed patent stent in the proximal segment 
but just proximal to the stent, there was a 70% stenosis noted.





Conclusion

1.  Severe three-vessel coronary artery disease including significant in-stent restenosis involving t
he left anterior descending and right coronary arteries

2.  Hypokinesis of the mid anterolateral wall and the diaphragmatic wall with ejection fraction estim
ated at 45%



Recommendations

Cardiothoracic surgery referral for possible coronary artery bypass surgery



Signed by : Tyra Rascon, 

Electronically Approved : 01/04/2022 12:02:57

## 2022-01-04 NOTE — NUR
attempted to remove 2 cc of air from TR band. started oozing from site. He is getting the 
carotid doppler, echo and the 12 lead ekg are completed. again uninstructed to not move his 
right hand. reinserted 2 cc air and and extra 2 ccs as instructed. VSS . given sandwich 
drink  for lunch ; tolerated well. states that his shoulder is uncomfortable andwas given 
fentanyl.

## 2022-01-04 NOTE — EKG
[facility]

              [address] [place] [address]

Test Date:    2022               Test Time:    17:44:41

Pat Name:     VIOLA HIGGINS           Department:   

Patient ID:   [id number]           Room[facility]

Gender:       M                        Technician:   

:          [id number]               Requested By: DEBBIE MONTEIRO

Order Number: [id number]           Reading MD:   David Rascon

                                 Measurements

Intervals                              Axis          

Rate:         91                       P:            139

CA:           154                      QRS:          -145

QRSD:         106                      T:            126

QT:           368                                    

QTc:          454                                    

                           Interpretive Statements

SINUS RHYTHM

ST & T ABNORMALITY, CONSIDER

HIGH LATERAL MYOCARDIAL OR PERICARDIAL DAMAGE

ABNORMAL ECG

Electronically Signed On 2022 15:15:34 CST by David Rascon

## 2022-01-04 NOTE — CARD
MR#: [id number]

Account#: [id number]

Accession#: [id number]

Date of Study: 01/04/2022

Ordering Physician: LICO KASPER, 

Referring Physician: LICO KASPER, 

Tech: Kirsten Hale [facility]





--------------- APPROVED REPORT --------------





EXAM: Two-dimensional and M-mode echocardiogram with Doppler and color Doppler.



Other Information 

Quality : Technically LimitedHR: 79bpm

Rhythm : NSR



INDICATION

Cardiac Disease: CAD 



Echo Enhancing Agent

Indication: Endocardial border delineation

Agent/Amount Used: Optison 2mL



RISK FACTORS

Hypertension 

Obesity   

Hyperlipidemia



2D DIMENSIONS 

RVDd3.5 (2.9-3.5cm)Left Atrium(2D)3.5 (1.6-4.0cm)

IVSd1.0 (0.7-1.1cm)LVDd3.1 (3.9-5.9cm)

PWd1.2 (0.7-1.1cm)LVDs2.0 (2.5-4.0cm)

FS (%) 35.3 %SV25.0 ml

LVEF(%)66.2 (>50%)



Aortic Valve

AoV Peak Callum.155.1cm/sAoV VTI32.4cm

AO Peak GR.9.6mmHgLVOT Peak Callum.124.3cm/s

AO Mean GR.5mmHg



Mitral Valve

MV E Esdsdhip22.5cm/sMV DECEL RYCU071kn

MV A Sxhcaegs332.1cm/sE/A  Ratio0.7



Pulmonary Valve

PV Peak Kyesimkv849.0cm/s



 LEFT VENTRICLE 

The left ventricle is normal size. There is borderline concentric left ventricular hypertrophy. Poste
robasal wall hypokinesis with ejection fraction estimated at 50-55%.



 RIGHT VENTRICLE 

The right ventricle is normal size. There is normal right ventricular wall thickness. The right ventr
icular systolic function is normal.



 ATRIA 

The left atrium size is normal. The right atrium size is normal. The interatrial septum is intact wit
h no evidence for an atrial septal defect or patent foramen ovale as noted on 2-D or Doppler imaging.




 AORTIC VALVE 

The aortic valve is normal in structure and function. Doppler and Color Flow revealed trace aortic re
gurgitation. There is no significant aortic valvular stenosis.



 MITRAL VALVE 

The mitral valve is normal in structure and function. There is no evidence of mitral valve prolapse. 
There is no mitral valve stenosis. Doppler and Color-flow revealed mild mitral regurgitation.



 TRICUSPID VALVE 

The tricuspid valve is normal in structure and function. Doppler and Color Flow revealed no tricuspid
 valve regurgitation noted. There is no tricuspid valve stenosis.



 PULMONIC VALVE 

The pulmonary valve is normal in structure and function. Doppler and Color Flow revealed no pulmonic 
valvular regurgitation.



 GREAT VESSELS 

The aortic root is normal in size. The ascending aorta is normal in size. The IVC is normal in size a
nd collapses >50% with inspiration.



 PERICARDIAL EFFUSION 

There is no evidence of significant pericardial effusion.



Critical Notification

Critical Value: No



<Conclusion>

Posterobasal wall hypokinesis with ejection fraction estimated at 50-55%.

Doppler and Color-flow revealed mild mitral regurgitation.

There is no evidence of significant pericardial effusion.



Signed by : David Rascon, 

Electronically Approved : 01/04/2022 17:15:04

## 2022-01-05 VITALS — SYSTOLIC BLOOD PRESSURE: 140 MMHG | DIASTOLIC BLOOD PRESSURE: 81 MMHG

## 2022-01-05 VITALS — DIASTOLIC BLOOD PRESSURE: 81 MMHG | SYSTOLIC BLOOD PRESSURE: 140 MMHG

## 2022-01-05 VITALS — SYSTOLIC BLOOD PRESSURE: 127 MMHG | DIASTOLIC BLOOD PRESSURE: 80 MMHG

## 2022-01-05 LAB — HBA1C MFR BLD: 6.2 % (ref 4.8–5.6)

## 2022-01-05 RX ADMIN — NITROGLYCERIN SCH INCH: 20 OINTMENT TOPICAL at 14:00

## 2022-01-05 RX ADMIN — FENTANYL CITRATE PRN MCG: 50 INJECTION INTRAMUSCULAR; INTRAVENOUS at 13:37

## 2022-01-05 RX ADMIN — METOPROLOL TARTRATE SCH MG: 25 TABLET ORAL at 08:15

## 2022-01-05 RX ADMIN — NITROGLYCERIN SCH INCH: 20 OINTMENT TOPICAL at 02:00

## 2022-01-05 RX ADMIN — HEPARIN SODIUM PRN MLS/HR: 10000 INJECTION, SOLUTION INTRAVENOUS at 11:25

## 2022-01-05 RX ADMIN — NITROGLYCERIN SCH INCH: 20 OINTMENT TOPICAL at 08:21

## 2022-01-05 RX ADMIN — SODIUM CHLORIDE SCH MLS/HR: 450 INJECTION, SOLUTION INTRAVENOUS at 03:34

## 2022-01-05 RX ADMIN — PANTOPRAZOLE SODIUM SCH MG: 40 TABLET, DELAYED RELEASE ORAL at 08:15

## 2022-01-05 RX ADMIN — NICOTINE SCH PATCH: 21 PATCH, EXTENDED RELEASE TOPICAL at 08:16

## 2022-01-05 RX ADMIN — ASPIRIN SCH MG: 325 TABLET, DELAYED RELEASE ORAL at 08:15

## 2022-01-05 NOTE — NUR
SS following up with discharge planning. SS reviewed pt chart and discussed with pt RN. Pt 
is currently on room air. Pt needing transfer for Cardiothoracic surgery. Cardiology met 
with pt this morning and pt now agreeable to [facility]. [facility] Transfer 
Center notified. Accepting physician, Dr. Owens. Currently awaiting bed assignment and 
report number at this time. Pt's RN notified. Packet, ambulance form, and transfer form on 
the chart. SS will continue to follow for discharge planning. 

-------------------------------------------------------------------------------

Addendum: 01/05/22 at 1439 by PARK MICHELLE SS

-------------------------------------------------------------------------------

SS received call from [facility] transfer [place]. Bed# 2305 provided for [facility]. Report# [phone]. RN, Janet. Pt will discharge and transfer to [facility] via [facility] ambulance first available. Pt and pt's RN notified.

## 2022-01-05 NOTE — PDOC
DUNCAN NEFF APRN 1/5/22 0939:


CARDIO Progress Notes


Date and Time


Date of Service


1/5/2022


Time of Evaluation


0910





Subjective


Subjective:  No Chest Pain, No shortness of breath, No Palpitations





Vitals


Vitals





Vital Signs








  Date Time  Temp Pulse Resp B/P (MAP) Pulse Ox O2 Delivery O2 Flow Rate FiO2


 


1/5/22 08:21  94  140/81    


 


1/5/22 03:30 97.5  20  96 Room Air  





 97.5       


 


1/4/22 20:29       98.0 








Weight


Weight [ ]





Input and Output


Intake and Output











Intake and Output 


 


 1/5/22





 07:00


 


Intake Total 840 ml


 


Output Total 1300 ml


 


Balance -460 ml


 


 


 


Intake Oral 840 ml


 


Output Urine Total 1300 ml











Laboratory


Labs





Laboratory Tests








Test


 1/4/22


16:36 1/5/22


05:25


 


Heparin Anti-Xa Act,


Unfractionated 0.35 IU/mL


(0.30-0.70) 0.25 IU/mL


(0.30-0.70)











Physical Exam


HEENT:  Neck Supple W Full Motion


Chest:  Symmetric


LUNGS:  Clear to Auscultation


Heart:  S1S2, RRR (SR), no murmurs


Abdomen:  Soft N/T


Extremities:  No Calf Tenderness, Other (2+ bilateral LE pitting edema)


Neurology:  alert, oriented, follow commands





Assessment


Assessment


1. NSTEMI: LHC revealed 3VD with significnat ISR to RCA and LAD


2. CAD: past PCI


3. HTN: controlled


4. HLP: not on goal. 


5. Tobaccoism


6. Obesity





Recommendations


1. Discussed significnatly with pt and agreed to transfer to [facility] for CABG


2. Smoking cessation


3. Discussed treatment adherence. Secondary prevention measures


4. ASA and continue heparin





Justicifation of Admission Dx:


Justifications for Admission:


Justification of Admission Dx:  Yes





TYRA GRUBER MD 1/5/22 2141:


CARDIO Progress Notes


Assessment


Assessment


Patient seen and examined.  Agree with NP's assessment and plan


NSTEMI with cath yesterday showing 3v CAD including instent restenoses in RCAS 

and LAD


Plan for transfer to [facility] today for CABG tomorrow


Continue current medical regimen











DUNCAN NEFF APRN           Jan 5, 2022 09:39


TYRA GRUBER MD            Jan 5, 2022 21:41

## 2022-01-05 NOTE — SNU/HH DC
DISCHARGE ORDERS


DISCHARGE INFORMATION:


DISCHARGE DATE:  Jan 5, 2022


FINAL DIAGNOSIS


Problems


Medical Problems:


(1) Acute coronary syndrome


Status: Acute  





(2) Chest pain


Status: Acute  








CONDITION ON DISCHARGE:  Stable





CODE STATUS:


Code Status:  Full





POST DISCHARGE ORDERS:


ACTIVITY ORDERS:  Resume previous activity


WEIGHT BEARING STATUS:  As tolerated


WOUND/INCISION CARE:  No wound care needed





CHECKS AFTER DISCHARGE:


CHECKS AFTER DISCHARGE:  Check blood press - daily





FOLLOW-UP:


PHYSICIAN FOLLOW-UP:  CT surgery at [facility]


ADDITIONAL FOLLOW-UP:  PCP after hospital discharge and cardiology after 

hospital discharge


LAB ORDERS FOR FOLLOW-UP:  CBC, CMP





TREATMENT/EQUIPMENT ORDERS:


ADAPTIVE EQUIPMENT NEEDED:  None


Physical Therapy For:  Evalulation/Treatment


Occupational Therapy For:  Evaluation/Treatment





DISCHARGE MEDICATIONS:


Home Meds


Active Scripts


Clopidogrel Bisulfate (CLOPIDOGREL) 75 Mg Tablet, 75 MG PO DAILYWBKFT for CAD, 

#30 TAB 9 Refills


   Prov:CINDY SARABIA MD         12/8/20


Aspirin (ASPIRIN EC) 81 Mg Tablet.dr, 81 MG PO DAILYWBKFT for CAD for 90 Days, 

#90 TAB.SR 3 Refills


   Prov:TREVON DA SILVA MD         11/25/20


Reported Medications


Famotidine (FAMOTIDINE) 40 Mg Tablet, 40 MG PO PRN DAILY PRN for INDIGESTION, 

TAB


   8/14/21


Calcium Carbonate (TUMS) 200 Mg Tab.chew, 200 MG PO PRN 1-2XD PRN for IN

DIGESTION, TAB.CHEW


   8/14/21


Atorvastatin Calcium (ATORVASTATIN CALCIUM) 40 Mg Tablet, 1 TAB PO DAILY for c

holsterol


   11/24/20


Metoprolol Tartrate (METOPROLOL TARTRATE) 25 Mg Tablet, 1 TAB PO BID for htn


   11/24/20











BRYAN STAFFORD MD                   Jan 5, 2022 11:19

## 2022-01-05 NOTE — NUR
Discharge Note:



VIOLA HIGGINS [address]



Discharge instructions and discharge home medications reviewed with Other facility and a 
copy given. All questions have been answered and understanding verbalized. Pt sent to [facility] 
with two IV sites intace. Right hand IV has heparin running via the rate flow regulator. 
Report given to EMS upon arrival as well as Janet CORREA from [facility]. Pt in stable condition at 
time of DC.

## 2022-01-07 NOTE — PDOC3
[facility]-Discharge Summary


Date of Admission:


Date of Admission:  Nathanael 3, 2022





Date of Discharge:


Date of Discharge:  Jan 5, 2022





Discharge Diagnosis:


Discharge Diagnosis:


Chest pain - with repeat EKG showing changes, will cont heparin GTT, trend 

troponins. consult cardiology. Smoking cessation emphasized. Will give 

nitropaste q6hrs for unstable angina. Protonix for GI PPX


CAD - s/p cath x5 with 9 stents - high risk repeat MI. Pending cardiac cath 

today.


HLD - statin


HTN - metoprolol, nitropaste


PVD - on ASA, plavix


Smoker - counseled on cessation





Consults:


Consults:


per cardiology:





Assessment


1. NSTEMI: LHC revealed 3VD with significnat ISR to RCA and LAD


2. CAD: past PCI


3. HTN: controlled


4. HLP: not on goal. 


5. Tobaccoism


6. Obesity





Recommendations


1. Discussed significnatly with pt and agreed to transfer to [facility] for CABG


2. Smoking cessation


3. Discussed treatment adherence. Secondary prevention measures


4. ASA and continue heparin





Justicifation of Admission Dx:


Justifications for Admission:


Justification of Admission Dx:  Yes





TYRA GRUBER MD 1/5/22 2141:


CARDIO Progress Notes


Assessment


Assessment


Patient seen and examined.  Agree with NP's assessment and plan


NSTEMI with cath yesterday showing 3v CAD including instent restenoses in RCAS 

and LAD


Plan for transfer to [facility] today for CABG tomorrow


Continue current medical regimen





Hospital Course:


Hospital Course:


 66  year old male with PMHx CAD (s/p cath x5 with 9 stents), HLD, HTN, PVD, 

smoker who presents to ED c/o sub-sternal chest pain that radiates down both of 

his arms that began at 1645 while at rest and was continuous, He notes this was 

similar to prior MI. He experienced some discomfort at 1000 this morning but it 

resolved on its own. He does intermittently experience anginal pain but it is 

generally remitting without treatment 1-3 times per month.





1/4/2022


No acute events overnight. Patient seen examined bedside. Patient taken to the 

Cath Lab for cardiac cath. Rising troponins. Patient's chart, labs, images were 

reviewed and discussed with RN





By day of discharge, pt was clinically stable and ready for discharge.  Rest of 

hospital course was uneventful





Disposition:


Disposition/Orders:  D/C to Home





Activity:


Activity:


Resume previous activity





Diet:


Diet:  Cardiac





Medications:


Home Meds


Active Scripts


Aspirin (ASPIRIN EC) 81 Mg Tablet.dr, 81 MG PO DAILYWBKFT for CAD for 90 Days, 

#90 TAB.SR 3 Refills


   Prov:TREVON DA SILVA MD         11/25/20


Reported Medications


Famotidine (FAMOTIDINE) 40 Mg Tablet, 40 MG PO PRN DAILY PRN for INDIGESTION, 

TAB


   8/14/21


Calcium Carbonate (TUMS) 200 Mg Tab.chew, 200 MG PO PRN 1-2XD PRN for 

INDIGESTION, TAB.CHEW


   8/14/21


Atorvastatin Calcium (ATORVASTATIN CALCIUM) 40 Mg Tablet, 1 TAB PO DAILY for 

cholsterol


   11/24/20


Metoprolol Tartrate (METOPROLOL TARTRATE) 25 Mg Tablet, 1 TAB PO BID for htn


   11/24/20


Discontinued Scripts


Clopidogrel Bisulfate (CLOPIDOGREL) 75 Mg Tablet, 75 MG PO DAILYWBKFT for CAD, 

#30 TAB 9 Refills


   Prov:CINDY SARABIA MD         12/8/20


Scheduled


Aspirin (Aspirin Ec), 81 MG PO DAILYWBKFT


Atorvastatin Calcium (Atorvastatin Calcium), 1 TAB PO DAILY, (Reported)


Metoprolol Tartrate (Metoprolol Tartrate), 1 TAB PO BID, (Reported)





Scheduled PRN


Calcium Carbonate (Tums), 200 MG PO PRN 1-2XD PRN for INDIGESTION, (Reported)


Famotidine (Famotidine), 40 MG PO PRN DAILY PRN for INDIGESTION, (Reported)





Discontinued Medications


Clopidogrel Bisulfate (Clopidogrel), 75 MG PO DAILYWBKFT





Total Time:


Total Time:


Total time spent was 32 minutes in preparing scripts and discharge planning with

SW and RN.


Patient seen and examined on day of Discharge.





Justicifation of Admission Dx:


Justifications for Admission:


Justification of Admission Dx:  Yes











BRYAN STAFFORD MD                   Jan 7, 2022 18:58